# Patient Record
Sex: FEMALE | Race: OTHER | Employment: UNEMPLOYED | URBAN - METROPOLITAN AREA
[De-identification: names, ages, dates, MRNs, and addresses within clinical notes are randomized per-mention and may not be internally consistent; named-entity substitution may affect disease eponyms.]

---

## 2017-02-06 ENCOUNTER — TELEPHONE (OUTPATIENT)
Dept: RHEUMATOLOGY | Facility: CLINIC | Age: 47
End: 2017-02-06

## 2017-02-27 ENCOUNTER — LAB ENCOUNTER (OUTPATIENT)
Dept: LAB | Facility: HOSPITAL | Age: 47
End: 2017-02-27
Attending: INTERNAL MEDICINE
Payer: COMMERCIAL

## 2017-02-27 DIAGNOSIS — Z51.81 ENCOUNTER FOR THERAPEUTIC DRUG MONITORING: ICD-10-CM

## 2017-02-27 DIAGNOSIS — M05.79 RHEUMATOID ARTHRITIS INVOLVING MULTIPLE SITES WITH POSITIVE RHEUMATOID FACTOR (HCC): ICD-10-CM

## 2017-02-27 LAB
ALBUMIN SERPL BCP-MCNC: 2.9 G/DL (ref 3.5–4.8)
AST SERPL-CCNC: 18 U/L (ref 15–41)
BASOPHILS # BLD: 0.1 K/UL (ref 0–0.2)
BASOPHILS NFR BLD: 1 %
CREAT SERPL-MCNC: 0.52 MG/DL (ref 0.5–1.5)
CRP SERPL-MCNC: 10.8 MG/DL (ref 0–0.9)
EOSINOPHIL # BLD: 0.2 K/UL (ref 0–0.7)
EOSINOPHIL NFR BLD: 4 %
ERYTHROCYTE [DISTWIDTH] IN BLOOD BY AUTOMATED COUNT: 20.2 % (ref 11–15)
ERYTHROCYTE [SEDIMENTATION RATE] IN BLOOD: 41 MM/HR (ref 0–20)
HCT VFR BLD AUTO: 25.7 % (ref 35–48)
HGB BLD-MCNC: 8.2 G/DL (ref 12–16)
LYMPHOCYTES # BLD: 1 K/UL (ref 1–4)
LYMPHOCYTES NFR BLD: 24 %
MCH RBC QN AUTO: 23 PG (ref 27–32)
MCHC RBC AUTO-ENTMCNC: 32 G/DL (ref 32–37)
MCV RBC AUTO: 71.8 FL (ref 80–100)
MONOCYTES # BLD: 0.3 K/UL (ref 0–1)
MONOCYTES NFR BLD: 7 %
NEUTROPHILS # BLD AUTO: 2.9 K/UL (ref 1.8–7.7)
NEUTROPHILS NFR BLD: 64 %
PLATELET # BLD AUTO: 538 K/UL (ref 140–400)
PMV BLD AUTO: 7.7 FL (ref 7.4–10.3)
RBC # BLD AUTO: 3.57 M/UL (ref 3.7–5.4)
WBC # BLD AUTO: 4.5 K/UL (ref 4–11)

## 2017-02-27 PROCEDURE — 86140 C-REACTIVE PROTEIN: CPT

## 2017-02-27 PROCEDURE — 84450 TRANSFERASE (AST) (SGOT): CPT

## 2017-02-27 PROCEDURE — 82565 ASSAY OF CREATININE: CPT

## 2017-02-27 PROCEDURE — 85652 RBC SED RATE AUTOMATED: CPT

## 2017-02-27 PROCEDURE — 85025 COMPLETE CBC W/AUTO DIFF WBC: CPT

## 2017-02-27 PROCEDURE — 82040 ASSAY OF SERUM ALBUMIN: CPT

## 2017-02-27 PROCEDURE — 36415 COLL VENOUS BLD VENIPUNCTURE: CPT

## 2017-02-28 ENCOUNTER — TELEPHONE (OUTPATIENT)
Dept: RHEUMATOLOGY | Facility: CLINIC | Age: 47
End: 2017-02-28

## 2017-02-28 ENCOUNTER — TELEPHONE (OUTPATIENT)
Dept: INTERNAL MEDICINE CLINIC | Facility: CLINIC | Age: 47
End: 2017-02-28

## 2017-02-28 DIAGNOSIS — M05.79 SEROPOSITIVE RHEUMATOID ARTHRITIS OF MULTIPLE SITES (HCC): Primary | ICD-10-CM

## 2017-02-28 NOTE — TELEPHONE ENCOUNTER
Pt advised of below and aware to schedule appt with hematologist. Results and referral mailed to pt. Referral approved and valid for 1 year. Pt given f/u appt with Dr. Rosalina Dai for 3/3. Referral request sent to IM.

## 2017-02-28 NOTE — TELEPHONE ENCOUNTER
Please give her a call. She has severe anemia, which continues. When I last saw her October 28th of 2016, I initiated a referral to hematology for Dr. Megan Blount. Please check to see if this was approved.   If not, we will need to refer her again to levon

## 2017-03-07 NOTE — TELEPHONE ENCOUNTER
LOV: 10-28-16  Last Refilled:#104, 1rf  10/21/16  Labs:AST 18 2/27/17    Future Appointments  Date Time Provider Jasper Villa   3/8/2017 10:00 AM Kirby Spears MD [address] [place] [address] HEM ONC EMO       Please advise.

## 2017-03-08 ENCOUNTER — HOSPITAL ENCOUNTER (OUTPATIENT)
Dept: GENERAL RADIOLOGY | Facility: HOSPITAL | Age: 47
Discharge: HOME | End: 2017-03-08
Attending: INTERNAL MEDICINE
Payer: COMMERCIAL

## 2017-03-08 ENCOUNTER — OFFICE VISIT (OUTPATIENT)
Dept: HEMATOLOGY/ONCOLOGY | Facility: HOSPITAL | Age: 47
End: 2017-03-08
Attending: INTERNAL MEDICINE
Payer: COMMERCIAL

## 2017-03-08 ENCOUNTER — LAB ENCOUNTER (OUTPATIENT)
Dept: LAB | Facility: HOSPITAL | Age: 47
End: 2017-03-08
Attending: INTERNAL MEDICINE
Payer: COMMERCIAL

## 2017-03-08 VITALS
TEMPERATURE: 99 F | RESPIRATION RATE: 16 BRPM | BODY MASS INDEX: 21.68 KG/M2 | SYSTOLIC BLOOD PRESSURE: 130 MMHG | HEART RATE: 115 BPM | HEIGHT: 64 IN | WEIGHT: 127 LBS | DIASTOLIC BLOOD PRESSURE: 86 MMHG

## 2017-03-08 DIAGNOSIS — D64.9 ANEMIA, UNSPECIFIED TYPE: ICD-10-CM

## 2017-03-08 DIAGNOSIS — D47.2 MGUS (MONOCLONAL GAMMOPATHY OF UNKNOWN SIGNIFICANCE): Primary | ICD-10-CM

## 2017-03-08 DIAGNOSIS — D47.2 MGUS (MONOCLONAL GAMMOPATHY OF UNKNOWN SIGNIFICANCE): ICD-10-CM

## 2017-03-08 LAB
ALBUMIN SERPL BCP-MCNC: 3 G/DL (ref 3.5–4.8)
ALBUMIN/GLOB SERPL: 0.6 {RATIO} (ref 1–2)
ALP SERPL-CCNC: 49 U/L (ref 32–100)
ALT SERPL-CCNC: 9 U/L (ref 14–54)
ANION GAP SERPL CALC-SCNC: 7 MMOL/L (ref 0–18)
AST SERPL-CCNC: 18 U/L (ref 15–41)
BASOPHILS # BLD: 0.1 K/UL (ref 0–0.2)
BASOPHILS NFR BLD: 1 %
BILIRUB SERPL-MCNC: 0.4 MG/DL (ref 0.3–1.2)
BUN SERPL-MCNC: 10 MG/DL (ref 8–20)
BUN/CREAT SERPL: 16.9 (ref 10–20)
CALCIUM SERPL-MCNC: 9.1 MG/DL (ref 8.5–10.5)
CHLORIDE SERPL-SCNC: 103 MMOL/L (ref 95–110)
CO2 SERPL-SCNC: 26 MMOL/L (ref 22–32)
CREAT SERPL-MCNC: 0.59 MG/DL (ref 0.5–1.5)
DIRECT COOMBS POLY: NEGATIVE
EOSINOPHIL # BLD: 0.2 K/UL (ref 0–0.7)
EOSINOPHIL NFR BLD: 3 %
ERYTHROCYTE [DISTWIDTH] IN BLOOD BY AUTOMATED COUNT: 20.2 % (ref 11–15)
FERRITIN SERPL IA-MCNC: 20 NG/ML (ref 11–307)
FOLATE SERPL-MCNC: 17.1 NG/ML
GLOBULIN PLAS-MCNC: 5.2 G/DL (ref 2.5–3.7)
GLUCOSE SERPL-MCNC: 94 MG/DL (ref 70–99)
HAPTOGLOB SERPL-MCNC: 236 MG/DL (ref 16–200)
HCT VFR BLD AUTO: 26.4 % (ref 35–48)
HGB BLD-MCNC: 8.3 G/DL (ref 12–16)
IGA SERPL-MCNC: 804 MG/DL (ref 68–378)
IGM SERPL-MCNC: 438 MG/DL (ref 60–263)
IMMUNOGLOBULIN PNL SER-MCNC: 2139 MG/DL (ref 694–1618)
IRON SATN MFR SERPL: 5 % (ref 15–50)
IRON SERPL-MCNC: 17 MCG/DL (ref 28–170)
LDH SERPL L TO P-CCNC: 140 U/L (ref 98–192)
LYMPHOCYTES # BLD: 1.1 K/UL (ref 1–4)
LYMPHOCYTES NFR BLD: 21 %
MCH RBC QN AUTO: 22.4 PG (ref 27–32)
MCHC RBC AUTO-ENTMCNC: 31.5 G/DL (ref 32–37)
MCV RBC AUTO: 71.2 FL (ref 80–100)
MONOCYTES # BLD: 0.4 K/UL (ref 0–1)
MONOCYTES NFR BLD: 7 %
NEUTROPHILS # BLD AUTO: 3.5 K/UL (ref 1.8–7.7)
NEUTROPHILS NFR BLD: 68 %
OSMOLALITY UR CALC.SUM OF ELEC: 281 MOSM/KG (ref 275–295)
PLATELET # BLD AUTO: 599 K/UL (ref 140–400)
PMV BLD AUTO: 7.8 FL (ref 7.4–10.3)
POTASSIUM SERPL-SCNC: 3.8 MMOL/L (ref 3.3–5.1)
PROT SERPL-MCNC: 8.2 G/DL (ref 5.9–8.4)
RBC # BLD AUTO: 3.71 M/UL (ref 3.7–5.4)
SODIUM SERPL-SCNC: 136 MMOL/L (ref 136–144)
TIBC SERPL-MCNC: 326 MCG/DL (ref 228–428)
TRANSFERRIN SERPL-MCNC: 247 MG/DL (ref 192–382)
TRANSFERRIN SERPL-MCNC: 247 MG/DL (ref 192–382)
VIT B12 SERPL-MCNC: 691 PG/ML (ref 181–914)
WBC # BLD AUTO: 5.1 K/UL (ref 4–11)

## 2017-03-08 PROCEDURE — 85025 COMPLETE CBC W/AUTO DIFF WBC: CPT

## 2017-03-08 PROCEDURE — 83020 HEMOGLOBIN ELECTROPHORESIS: CPT

## 2017-03-08 PROCEDURE — 82728 ASSAY OF FERRITIN: CPT

## 2017-03-08 PROCEDURE — 84165 PROTEIN E-PHORESIS SERUM: CPT

## 2017-03-08 PROCEDURE — 36415 COLL VENOUS BLD VENIPUNCTURE: CPT

## 2017-03-08 PROCEDURE — 83615 LACTATE (LD) (LDH) ENZYME: CPT

## 2017-03-08 PROCEDURE — 80053 COMPREHEN METABOLIC PANEL: CPT

## 2017-03-08 PROCEDURE — 86334 IMMUNOFIX E-PHORESIS SERUM: CPT

## 2017-03-08 PROCEDURE — 99245 OFF/OP CONSLTJ NEW/EST HI 55: CPT | Performed by: INTERNAL MEDICINE

## 2017-03-08 PROCEDURE — 84466 ASSAY OF TRANSFERRIN: CPT

## 2017-03-08 PROCEDURE — 82746 ASSAY OF FOLIC ACID SERUM: CPT

## 2017-03-08 PROCEDURE — 77075 RADEX OSSEOUS SURVEY COMPL: CPT

## 2017-03-08 PROCEDURE — 85060 BLOOD SMEAR INTERPRETATION: CPT

## 2017-03-08 PROCEDURE — 82607 VITAMIN B-12: CPT

## 2017-03-08 PROCEDURE — 82784 ASSAY IGA/IGD/IGG/IGM EACH: CPT

## 2017-03-08 PROCEDURE — 83883 ASSAY NEPHELOMETRY NOT SPEC: CPT

## 2017-03-08 PROCEDURE — 86880 COOMBS TEST DIRECT: CPT

## 2017-03-08 PROCEDURE — 83021 HEMOGLOBIN CHROMOTOGRAPHY: CPT

## 2017-03-08 PROCEDURE — 99212 OFFICE O/P EST SF 10 MIN: CPT | Performed by: INTERNAL MEDICINE

## 2017-03-08 PROCEDURE — 83540 ASSAY OF IRON: CPT

## 2017-03-08 PROCEDURE — 83010 ASSAY OF HAPTOGLOBIN QUANT: CPT

## 2017-03-08 RX ORDER — MAG HYDROX/ALUMINUM HYD/SIMETH 400-400-40
SUSPENSION, ORAL (FINAL DOSE FORM) ORAL
COMMUNITY

## 2017-03-08 RX ORDER — FERROUS SULFATE 325(65) MG
325 TABLET ORAL 2 TIMES DAILY WITH MEALS
Qty: 60 TABLET | Refills: 2 | Status: SHIPPED | OUTPATIENT
Start: 2017-03-08

## 2017-03-08 NOTE — PROGRESS NOTES
Hematology Consultation Note    Patient Name: Gretchen Covert   YOB: 1970   Medical Record Number: [id number]   CSN: [id number]   Consulting Physician: Sheba Spencer MD  Referring Physician(s): Myke Mclaughlin  Date of Consultation: 3/8/ HISTORY  2008    Comment fibroid surgery       Family Medical History:  Family History   Problem Relation Age of Onset   • Prostate Cancer Father    • Diabetes Father    • Hypertension Mother    • Musculo-skelatal Disorder Mother      spinal stenosis   • A tablet (5 mg total) by mouth once daily. Disp: 90 tablet Rfl: 3   TraMADol HCl 50 MG Oral Tab Take 1 tablet (50 mg total) by mouth every 6 (six) hours as needed for Pain. Disp: 60 tablet Rfl: 1   Misc.  Devices (QUICK-FIT CRUTCHES) Does not apply Misc 1 kit and rhythm. S1S2 normal.  Abdomen: Soft, non tender with good bowel sounds. No hepatosplenomegaly. No palpable mass. Extremities: No edema or calf tenderness. +Jayesh's nodes in right PIP second & third digits  Neurological: Grossly intact.       Labs: TRANSFERRIN, LDH, HAPTOGLOBIN, JOSEPH, DIRECT,         VITAMIN Q92, FOLIC ACID SERUM(FOLATE),         HEMOGLOBINOPATHY PROFILE  56 yo F with PMH of anemia since childhood and noted IgG MGUS via immunofixation in 4/2014 presents for continued evalua iron replacement with ferrous sulfate 325 mg BID with meals.  Informed pt of side effects of possible nausea, dark stools, and constipation    RTC in 1 week for return of results and to discuss next steps in management    Emotional Well Being:    Emotional

## 2017-03-08 NOTE — PATIENT INSTRUCTIONS
Please, get labs drawn today and schedule your skeletal x-ray test as soon as possible    Please, return for follow up in 1 week

## 2017-03-10 LAB
ALBUMIN SERPL ELPH-MCNC: 3.34 G/DL (ref 3.8–5.8)
ALBUMIN/GLOB SERPL: 0.8 {RATIO} (ref 1–2)
ALPHA1 GLOB SERPL ELPH-MCNC: 0.28 G/DL (ref 0.1–0.3)
ALPHA2 GLOB SERPL ELPH-MCNC: 0.75 G/DL (ref 0.6–1)
B-GLOBULIN SERPL ELPH-MCNC: 1.14 G/DL (ref 0.7–1.3)
GAMMA GLOB SERPL ELPH-MCNC: 2 G/DL (ref 0.5–1.7)
HGB A2 MFR BLD: 1.7 % (ref 1.5–3.5)
HGB F MFR BLD: 0 % (ref 0–2)
HGB PNL BLD ELPH: 97.1 % (ref 95.5–100)
HGB S MFR BLD: 0 %
KAPPA FREE LIGHT CHAIN: 7.44 MG/DL (ref 0.33–1.94)
KAPPA/LAMBDA FLC RATIO: 1.04 (ref 0.26–1.65)
LAMBDA FREE LIGHT CHAIN: 7.16 MG/DL (ref 0.57–2.63)
M PROTEIN SERPL ELPH-MCNC: 0.89 G/DL
TOTAL PROTEIN (SPECIAL TESTING): 7.5 G/DL (ref 6.5–9.1)

## 2017-03-15 ENCOUNTER — TELEPHONE (OUTPATIENT)
Dept: HEMATOLOGY/ONCOLOGY | Facility: HOSPITAL | Age: 47
End: 2017-03-15

## 2017-03-15 ENCOUNTER — APPOINTMENT (OUTPATIENT)
Dept: HEMATOLOGY/ONCOLOGY | Facility: HOSPITAL | Age: 47
End: 2017-03-15
Attending: INTERNAL MEDICINE
Payer: COMMERCIAL

## 2017-03-15 NOTE — TELEPHONE ENCOUNTER
Call request reviewed with Dr Mau Arechiga. Per MD, patient called and reassured her skeletal survey and labwork were reviewed by Dr Mau Arechiga and there was nothing serious to report.  He would, however,  like to provide her with the benefit of full explainantion of

## 2017-03-15 NOTE — TELEPHONE ENCOUNTER
Esther Dailey canceled her appointment today. She says she has bad Rheumatoid arthritis and with the weather she will not be able to make it. But she is very anxious for the results, can someone call her with a update? She will still like to reschedule as well.  Pl

## 2017-03-21 ENCOUNTER — OFFICE VISIT (OUTPATIENT)
Dept: HEMATOLOGY/ONCOLOGY | Facility: HOSPITAL | Age: 47
End: 2017-03-21
Attending: INTERNAL MEDICINE
Payer: COMMERCIAL

## 2017-03-21 VITALS
HEIGHT: 64 IN | HEART RATE: 105 BPM | RESPIRATION RATE: 16 BRPM | SYSTOLIC BLOOD PRESSURE: 126 MMHG | TEMPERATURE: 99 F | DIASTOLIC BLOOD PRESSURE: 86 MMHG

## 2017-03-21 DIAGNOSIS — D50.0 IRON DEFICIENCY ANEMIA DUE TO CHRONIC BLOOD LOSS: Primary | ICD-10-CM

## 2017-03-21 PROCEDURE — 99215 OFFICE O/P EST HI 40 MIN: CPT | Performed by: INTERNAL MEDICINE

## 2017-03-21 PROCEDURE — 99212 OFFICE O/P EST SF 10 MIN: CPT | Performed by: INTERNAL MEDICINE

## 2017-03-21 NOTE — PROGRESS NOTES
Hematology Progress Note    Patient Name: Russ Hoyos   YOB: 1970   Medical Record Number: [id number]    Consulting Physician: Bishop Kaufman MD  Referring Physician(s): Milind Nelson  Date of Visit: 3/21/2017     Chief Complaint:   IgG of systems she denies any new concerns or complaints today. Farooq Nguyenbear mentions problems related to her rheumatoid arthritis with significant joint pain over the last week or so.        Allergies:     Erythromycin                Comment:Other reaction(s): nause pruritus. Hematologic/lymphatic: Negative for easy bruising, bleeding, and lymphadenopathy.   Musculoskeletal: Positive for stiffness, arthralgias, joint pain with rheumatoid nodules in her PIP region  Neurological: Negative for headaches, dizziness, speec 03/08/2017 11:51 AM   ALKPHO 49 03/08/2017 11:51 AM   AST 18 03/08/2017 11:51 AM   ALT 9* 03/08/2017 11:51 AM         Impression:    (D50.0) Iron deficiency anemia due to chronic blood loss  (primary encounter diagnosis)  Plan: CBC WITH DIFFERENTIAL WITH P deficiency    --Informed patient that based on her -American descent and family history of a paternal cousin with sickle cell anemia; she might have a hemoglobinopathy such as sickle cell trait or beta thalasemmia  --Hemoglobin electrophoresis is no

## 2017-05-23 ENCOUNTER — APPOINTMENT (OUTPATIENT)
Dept: HEMATOLOGY/ONCOLOGY | Facility: HOSPITAL | Age: 47
End: 2017-05-23
Attending: INTERNAL MEDICINE
Payer: COMMERCIAL

## 2017-06-12 RX ORDER — NAPROXEN 500 MG/1
TABLET ORAL
Qty: 240 TABLET | Refills: 0 | Status: SHIPPED | OUTPATIENT
Start: 2017-06-12 | End: 2017-06-30

## 2017-06-19 NOTE — TELEPHONE ENCOUNTER
Phoned patient and left a message on her personal voice mail to do labs and schedule a f/u. Please see Dr. Jamee Velasquez note below.

## 2017-06-30 ENCOUNTER — TELEPHONE (OUTPATIENT)
Dept: RHEUMATOLOGY | Facility: CLINIC | Age: 47
End: 2017-06-30

## 2017-06-30 NOTE — TELEPHONE ENCOUNTER
Spoke with pharmacy and will keep naproxen 250 mg 1-2 tablets BID prn with food to avoid confusion for the pt.

## 2017-06-30 NOTE — TELEPHONE ENCOUNTER
Palomo Sheikh from [facility] wants clarification on the medication listed below. States that the patient has been on 250 MG but has a new script for 500 MG. Wants to clarify which is more current - so that she can close out one or the other. Please advise.    Humberto Rachel

## 2017-07-14 ENCOUNTER — TELEPHONE (OUTPATIENT)
Dept: RHEUMATOLOGY | Facility: CLINIC | Age: 47
End: 2017-07-14

## 2017-07-14 DIAGNOSIS — M05.79 SEROPOSITIVE RHEUMATOID ARTHRITIS OF MULTIPLE SITES (HCC): Primary | ICD-10-CM

## 2017-07-14 DIAGNOSIS — Z51.81 ENCOUNTER FOR THERAPEUTIC DRUG MONITORING: ICD-10-CM

## 2017-08-12 ENCOUNTER — APPOINTMENT (OUTPATIENT)
Dept: LAB | Facility: HOSPITAL | Age: 47
End: 2017-08-12
Attending: INTERNAL MEDICINE
Payer: COMMERCIAL

## 2017-08-12 ENCOUNTER — OFFICE VISIT (OUTPATIENT)
Dept: RHEUMATOLOGY | Facility: CLINIC | Age: 47
End: 2017-08-12

## 2017-08-12 VITALS
HEIGHT: 64 IN | SYSTOLIC BLOOD PRESSURE: 124 MMHG | TEMPERATURE: 98 F | DIASTOLIC BLOOD PRESSURE: 81 MMHG | HEART RATE: 85 BPM

## 2017-08-12 DIAGNOSIS — G89.29 CHRONIC PAIN OF BOTH KNEES: ICD-10-CM

## 2017-08-12 DIAGNOSIS — Z51.81 ENCOUNTER FOR THERAPEUTIC DRUG MONITORING: ICD-10-CM

## 2017-08-12 DIAGNOSIS — D47.2 MGUS (MONOCLONAL GAMMOPATHY OF UNKNOWN SIGNIFICANCE): ICD-10-CM

## 2017-08-12 DIAGNOSIS — M85.89 OSTEOPENIA OF MULTIPLE SITES: ICD-10-CM

## 2017-08-12 DIAGNOSIS — M05.79 RHEUMATOID ARTHRITIS INVOLVING MULTIPLE SITES WITH POSITIVE RHEUMATOID FACTOR (HCC): Primary | ICD-10-CM

## 2017-08-12 DIAGNOSIS — M25.561 CHRONIC PAIN OF BOTH KNEES: ICD-10-CM

## 2017-08-12 DIAGNOSIS — M25.562 CHRONIC PAIN OF BOTH KNEES: ICD-10-CM

## 2017-08-12 DIAGNOSIS — D50.9 IRON DEFICIENCY ANEMIA, UNSPECIFIED IRON DEFICIENCY ANEMIA TYPE: ICD-10-CM

## 2017-08-12 PROCEDURE — 20610 DRAIN/INJ JOINT/BURSA W/O US: CPT | Performed by: INTERNAL MEDICINE

## 2017-08-12 PROCEDURE — 99212 OFFICE O/P EST SF 10 MIN: CPT | Performed by: INTERNAL MEDICINE

## 2017-08-12 PROCEDURE — 99214 OFFICE O/P EST MOD 30 MIN: CPT | Performed by: INTERNAL MEDICINE

## 2017-08-12 RX ORDER — PREDNISONE 1 MG/1
5 TABLET ORAL
Qty: 90 TABLET | Refills: 3 | Status: SHIPPED | OUTPATIENT
Start: 2017-08-12 | End: 2017-08-18

## 2017-08-12 RX ORDER — NAPROXEN 500 MG/1
TABLET ORAL
Qty: 180 TABLET | Refills: 1 | Status: SHIPPED | OUTPATIENT
Start: 2017-08-12 | End: 2018-09-27

## 2017-08-12 RX ORDER — METHYLPREDNISOLONE ACETATE 40 MG/ML
40 INJECTION, SUSPENSION INTRA-ARTICULAR; INTRALESIONAL; INTRAMUSCULAR; SOFT TISSUE
Status: SHIPPED | OUTPATIENT
Start: 2017-08-12 | End: 2017-08-12

## 2017-08-12 RX ORDER — PANTOPRAZOLE SODIUM 40 MG/1
40 TABLET, DELAYED RELEASE ORAL
Qty: 90 TABLET | Refills: 1 | Status: SHIPPED | OUTPATIENT
Start: 2017-08-12

## 2017-08-12 RX ORDER — FOLIC ACID 1 MG/1
1 TABLET ORAL
Qty: 90 TABLET | Refills: 3 | Status: SHIPPED | OUTPATIENT
Start: 2017-08-12

## 2017-08-12 RX ORDER — TRAMADOL HYDROCHLORIDE 50 MG/1
50 TABLET ORAL EVERY 6 HOURS PRN
Qty: 100 TABLET | Refills: 1 | Status: SHIPPED | OUTPATIENT
Start: 2017-08-12 | End: 2018-09-27

## 2017-08-12 NOTE — PROCEDURES
Joint Injection  Pre-procedure care:  Consent was obtained, Procedure/risks were explained, Questions were answered, Patient was prepped and draped in the usual sterile fashion, Correct side and site confirmed and Correct patient identified  Rheumatoid art

## 2017-08-12 NOTE — PROGRESS NOTES
HPI:    Patient ID: Danielle Do is a 52year old female. Vinny Su is a 57-year-old woman with severely destructive rheumatoid arthritis, who I last saw October 28th of 2016.  She has since continued prednisone 5 mg a day, the increased dose of methotr diaphoresis and fever. Respiratory: Negative for shortness of breath. Cardiovascular: Negative for chest pain. Gastrointestinal: Negative for abdominal pain. Skin: Negative for rash. Hematological: Negative for adenopathy.             Current Out She exhibits no mass. There is no tenderness. There is no rebound and no guarding. Musculoskeletal: She exhibits no edema. Shoulder motion is limited and painful. Deformity of both elbows. She cannot fully extend them.   Wrist motion is limited and pa Continue tramadol 50 mg every 6 hours as needed for pain. 7.  Bone health. Chronic prednisone. A DEXA scan was reordered. Continue calcium and vitamin D. No orders of the defined types were placed in this encounter.       Meds This Visit:  No presc

## 2017-08-15 ENCOUNTER — TELEPHONE (OUTPATIENT)
Dept: RHEUMATOLOGY | Facility: CLINIC | Age: 47
End: 2017-08-15

## 2017-08-15 NOTE — TELEPHONE ENCOUNTER
Advised pt, [facility] is having difficulty processing Humira script. Pt stated it was okay to sent script to [address] [place] [address].

## 2017-08-18 RX ORDER — PREDNISONE 1 MG/1
TABLET ORAL
Qty: 90 TABLET | Refills: 0 | Status: SHIPPED | OUTPATIENT
Start: 2017-08-18

## 2017-08-18 NOTE — TELEPHONE ENCOUNTER
Spoke with pt and let her know to have labs done. Pt asked about Dexa Scan order. I gave her the number to Central Scheduling and informed her the order is in the system.

## 2017-09-05 ENCOUNTER — TELEPHONE (OUTPATIENT)
Dept: RHEUMATOLOGY | Facility: CLINIC | Age: 47
End: 2017-09-05

## 2017-09-05 NOTE — TELEPHONE ENCOUNTER
Correct. She needs to schedule her DEXA scan. She also is long overdue for her standing labs. She can do them when she comes in for her DEXA scan. She did not go to the lab to have them done after I last saw her. Please let her know. Thank you.

## 2017-09-05 NOTE — TELEPHONE ENCOUNTER
Please see below and advise. XR BONE SURVEY, COMPLETE (CPT=77075) was completed in March.  Complete Dexa Scan??

## 2017-09-25 ENCOUNTER — OFFICE VISIT (OUTPATIENT)
Dept: INTERNAL MEDICINE CLINIC | Facility: CLINIC | Age: 47
End: 2017-09-25

## 2017-09-25 ENCOUNTER — LAB ENCOUNTER (OUTPATIENT)
Dept: LAB | Facility: HOSPITAL | Age: 47
End: 2017-09-25
Attending: INTERNAL MEDICINE
Payer: COMMERCIAL

## 2017-09-25 VITALS
SYSTOLIC BLOOD PRESSURE: 118 MMHG | TEMPERATURE: 98 F | HEART RATE: 102 BPM | HEIGHT: 64 IN | DIASTOLIC BLOOD PRESSURE: 79 MMHG

## 2017-09-25 DIAGNOSIS — Z00.00 ANNUAL PHYSICAL EXAM: ICD-10-CM

## 2017-09-25 DIAGNOSIS — Z51.81 ENCOUNTER FOR THERAPEUTIC DRUG MONITORING: ICD-10-CM

## 2017-09-25 DIAGNOSIS — Z00.00 ANNUAL PHYSICAL EXAM: Primary | ICD-10-CM

## 2017-09-25 DIAGNOSIS — M05.79 SEROPOSITIVE RHEUMATOID ARTHRITIS OF MULTIPLE SITES (HCC): ICD-10-CM

## 2017-09-25 LAB
ALBUMIN SERPL BCP-MCNC: 3 G/DL (ref 3.5–4.8)
ALBUMIN/GLOB SERPL: 0.6 {RATIO} (ref 1–2)
ALP SERPL-CCNC: 55 U/L (ref 32–100)
ALT SERPL-CCNC: 8 U/L (ref 14–54)
ANION GAP SERPL CALC-SCNC: 8 MMOL/L (ref 0–18)
AST SERPL-CCNC: 16 U/L (ref 15–41)
BASOPHILS # BLD: 0 K/UL (ref 0–0.2)
BASOPHILS NFR BLD: 1 %
BILIRUB SERPL-MCNC: 0.3 MG/DL (ref 0.3–1.2)
BUN SERPL-MCNC: 11 MG/DL (ref 8–20)
BUN/CREAT SERPL: 20.4 (ref 10–20)
CALCIUM SERPL-MCNC: 8.9 MG/DL (ref 8.5–10.5)
CHLORIDE SERPL-SCNC: 102 MMOL/L (ref 95–110)
CO2 SERPL-SCNC: 26 MMOL/L (ref 22–32)
CREAT SERPL-MCNC: 0.54 MG/DL (ref 0.5–1.5)
CRP SERPL-MCNC: 17 MG/DL (ref 0–0.9)
EOSINOPHIL # BLD: 0.1 K/UL (ref 0–0.7)
EOSINOPHIL NFR BLD: 2 %
ERYTHROCYTE [DISTWIDTH] IN BLOOD BY AUTOMATED COUNT: 19.6 % (ref 11–15)
ERYTHROCYTE [SEDIMENTATION RATE] IN BLOOD: 74 MM/HR (ref 0–20)
GLOBULIN PLAS-MCNC: 5 G/DL (ref 2.5–3.7)
GLUCOSE SERPL-MCNC: 95 MG/DL (ref 70–99)
HCT VFR BLD AUTO: 26.1 % (ref 35–48)
HGB BLD-MCNC: 8.3 G/DL (ref 12–16)
LYMPHOCYTES # BLD: 1.1 K/UL (ref 1–4)
LYMPHOCYTES NFR BLD: 26 %
MCH RBC QN AUTO: 22.8 PG (ref 27–32)
MCHC RBC AUTO-ENTMCNC: 31.8 G/DL (ref 32–37)
MCV RBC AUTO: 71.7 FL (ref 80–100)
MONOCYTES # BLD: 0.3 K/UL (ref 0–1)
MONOCYTES NFR BLD: 8 %
NEUTROPHILS # BLD AUTO: 2.6 K/UL (ref 1.8–7.7)
NEUTROPHILS NFR BLD: 63 %
OSMOLALITY UR CALC.SUM OF ELEC: 281 MOSM/KG (ref 275–295)
PLATELET # BLD AUTO: 636 K/UL (ref 140–400)
PMV BLD AUTO: 7.9 FL (ref 7.4–10.3)
POTASSIUM SERPL-SCNC: 3.6 MMOL/L (ref 3.3–5.1)
PROT SERPL-MCNC: 8 G/DL (ref 5.9–8.4)
RBC # BLD AUTO: 3.64 M/UL (ref 3.7–5.4)
SODIUM SERPL-SCNC: 136 MMOL/L (ref 136–144)
TSH SERPL-ACNC: 0.85 UIU/ML (ref 0.45–5.33)
WBC # BLD AUTO: 4.1 K/UL (ref 4–11)

## 2017-09-25 PROCEDURE — 85652 RBC SED RATE AUTOMATED: CPT

## 2017-09-25 PROCEDURE — 84443 ASSAY THYROID STIM HORMONE: CPT

## 2017-09-25 PROCEDURE — 36415 COLL VENOUS BLD VENIPUNCTURE: CPT

## 2017-09-25 PROCEDURE — 86140 C-REACTIVE PROTEIN: CPT

## 2017-09-25 PROCEDURE — 99396 PREV VISIT EST AGE 40-64: CPT | Performed by: INTERNAL MEDICINE

## 2017-09-25 PROCEDURE — 85025 COMPLETE CBC W/AUTO DIFF WBC: CPT

## 2017-09-25 PROCEDURE — 80053 COMPREHEN METABOLIC PANEL: CPT

## 2017-09-25 NOTE — PROGRESS NOTES
HPI:    Patient ID: Raimundo Umana is a 52year old female. HPI  Annual Preventative Exam  Patient  arrives today for annual preventative physical examination. The patient complains of no new problems and has 0/10 pain.        Review of Systems   Cons Rfl: 1   TraMADol HCl 50 MG Oral Tab Take 1 tablet (50 mg total) by mouth every 6 (six) hours as needed for Pain.  Disp: 100 tablet Rfl: 1   methotrexate 2.5 MG Oral Tab TAKE 8 TABLETS BY MOUTH EVERY FRIDAY WITH DINNER Disp: 104 tablet Rfl: 1   folic acid 1 (1.626 m)         There is no height or weight on file to calculate BMI. ASSESSMENT/PLAN:   (Z00.00) Annual physical exam  (primary encounter diagnosis)  Pt presented to the clinic for a physical examination, which was unremarkable.    Plan:  -CBC

## 2017-09-27 ENCOUNTER — TELEPHONE (OUTPATIENT)
Dept: ADMINISTRATIVE | Age: 47
End: 2017-09-27

## 2017-09-27 NOTE — TELEPHONE ENCOUNTER
Dr. David Ruiz pending in [facility]. Last year, pt's spouse had 1-7 days per month for the year of intermittent time off to care for pt. Do you approve the same time off? Please advise.      Thank you,  Meredith Ashton

## 2017-10-06 ENCOUNTER — TELEPHONE (OUTPATIENT)
Dept: RHEUMATOLOGY | Facility: CLINIC | Age: 47
End: 2017-10-06

## 2017-10-10 ENCOUNTER — TELEPHONE (OUTPATIENT)
Dept: RHEUMATOLOGY | Facility: CLINIC | Age: 47
End: 2017-10-10

## 2017-10-10 NOTE — TELEPHONE ENCOUNTER
Our office received a fax from [facility] stating they were unable to reach the pt for Humira delivery. Left message to call  Pharmacy to schedule delivery date for Humira at [phone].

## 2017-12-28 RX ORDER — METHOTREXATE 2.5 MG/1
TABLET ORAL
Qty: 104 TABLET | Refills: 0 | Status: SHIPPED | OUTPATIENT
Start: 2017-12-28 | End: 2022-04-06

## 2017-12-28 NOTE — TELEPHONE ENCOUNTER
LOV: 8-12-17  Last Refilled:#104, 1rf 8/12/17  Labs:AST 16  9/25/17    No future appointments. Please advise.

## 2018-01-11 ENCOUNTER — TELEPHONE (OUTPATIENT)
Dept: RHEUMATOLOGY | Facility: CLINIC | Age: 48
End: 2018-01-11

## 2018-01-11 NOTE — TELEPHONE ENCOUNTER
Pharmacy called in stating that she was running the Humira through her protocol and it came back as it needs a high dollar overview. Pharmacy stating that they need Dr. Jessica Morgan to review this and let them know.  Pharmacy states to call #[phone] to sp

## 2018-01-11 NOTE — TELEPHONE ENCOUNTER
Spoke with pharmacy and advised them PA for Humira is valid until 2/18. Per pharmacy another PA is needed for high dollar override. Pharmacy stated they would send form to office.  After 20 minutes on hold, office was advised no additional PA is need at [facility]

## 2018-04-05 RX ORDER — TRAMADOL HYDROCHLORIDE 50 MG/1
50 TABLET ORAL EVERY 6 HOURS PRN
Qty: 100 TABLET | Refills: 1 | OUTPATIENT
Start: 2018-04-05

## 2018-04-05 RX ORDER — NAPROXEN 500 MG/1
TABLET ORAL
Qty: 180 TABLET | Refills: 1 | OUTPATIENT
Start: 2018-04-05

## 2018-04-05 NOTE — TELEPHONE ENCOUNTER
LOV: 8/12/17 refilled at [facility]  Future Appointments  Date Time Provider Jasper Villa   4/16/2018 5:30 PM Waqar Hoff MD [facility]     Labs:   Component      Latest Ref Rng & Units 9/25/2017   Glucose      70 - 99 mg/dL 95   Sodium      136 - 144 mmol/L 136   Potassium      3.3 - 5.1 mmol/L 3.6   Chloride      95 - 110 mmol/L 102   Carbon Dioxide, Total      22 - 32 mmol/L 26   BUN      8 - 20 mg/dL 11   CREATININE      0.50 - 1.50 mg/dL 0.54   CALCIUM      8.5 - 10.5 mg/dL 8.9   ALT (SGPT)      14 - 54 U/L 8 (L)   AST (SGOT)      15 - 41 U/L 16   ALKALINE PHOSPHATASE      32 - 100 U/L 55   Total Bilirubin      0.3 - 1.2 mg/dL 0.3   TOTAL PROTEIN      5.9 - 8.4 g/dL 8.0   Albumin      3.5 - 4.8 g/dL 3.0 (L)   GLOBULIN, TOTAL      2.5 - 3.7 g/dL 5.0 (H)   A/G RATIO      1.0 - 2.0 0.6 (L)   ANION GAP      0 - 18 mmol/L 8   BUN/CREA RATIO      10.0 - 20.0 20.4 (H)   CALCULATED OSMOLALITY      275 - 295 mOsm/kg 281   GFR, Non-      >=60 >60   GFR, -American      >=60 >60

## 2018-04-05 NOTE — TELEPHONE ENCOUNTER
Saima Raphael is calling b/c she states they faxed over refill request on March 26th and 30th and are awaiting approval. pls advise. Thank you      Current Outpatient Prescriptions:  naproxen 500 MG Oral Tab Take one twice daily with food. Disp: 180 tablet Rfl: 1   TraMADol HCl 50 MG Oral Tab Take 1 tablet (50 mg total) by mouth every 6 (six) hours as needed for Pain.  Disp: 100 tablet Rfl: 1

## 2018-09-27 ENCOUNTER — TELEPHONE (OUTPATIENT)
Dept: INTERNAL MEDICINE CLINIC | Facility: CLINIC | Age: 48
End: 2018-09-27

## 2018-09-27 ENCOUNTER — OFFICE VISIT (OUTPATIENT)
Dept: INTERNAL MEDICINE CLINIC | Facility: CLINIC | Age: 48
End: 2018-09-27

## 2018-09-27 VITALS
HEIGHT: 64 IN | WEIGHT: 130.19 LBS | TEMPERATURE: 98 F | SYSTOLIC BLOOD PRESSURE: 136 MMHG | DIASTOLIC BLOOD PRESSURE: 95 MMHG | HEART RATE: 103 BPM | BODY MASS INDEX: 22.23 KG/M2

## 2018-09-27 DIAGNOSIS — Z74.09 IMPAIRED MOBILITY AND ADLS: ICD-10-CM

## 2018-09-27 DIAGNOSIS — Z51.81 ENCOUNTER FOR THERAPEUTIC DRUG MONITORING: ICD-10-CM

## 2018-09-27 DIAGNOSIS — Z12.39 SCREENING FOR BREAST CANCER: ICD-10-CM

## 2018-09-27 DIAGNOSIS — M05.79 RHEUMATOID ARTHRITIS INVOLVING MULTIPLE SITES WITH POSITIVE RHEUMATOID FACTOR (HCC): Primary | ICD-10-CM

## 2018-09-27 DIAGNOSIS — D47.2 MGUS (MONOCLONAL GAMMOPATHY OF UNKNOWN SIGNIFICANCE): ICD-10-CM

## 2018-09-27 DIAGNOSIS — Z78.9 IMPAIRED MOBILITY AND ADLS: ICD-10-CM

## 2018-09-27 DIAGNOSIS — D50.9 IRON DEFICIENCY ANEMIA, UNSPECIFIED IRON DEFICIENCY ANEMIA TYPE: ICD-10-CM

## 2018-09-27 DIAGNOSIS — Z74.09 IMPAIRED MOBILITY AND ENDURANCE: ICD-10-CM

## 2018-09-27 PROCEDURE — 99212 OFFICE O/P EST SF 10 MIN: CPT | Performed by: INTERNAL MEDICINE

## 2018-09-27 PROCEDURE — 99214 OFFICE O/P EST MOD 30 MIN: CPT | Performed by: INTERNAL MEDICINE

## 2018-09-27 RX ORDER — TRAMADOL HYDROCHLORIDE 50 MG/1
50 TABLET ORAL EVERY 6 HOURS PRN
Qty: 100 TABLET | Refills: 1 | Status: SHIPPED | OUTPATIENT
Start: 2018-09-27

## 2018-09-27 RX ORDER — NAPROXEN 500 MG/1
TABLET ORAL
Qty: 180 TABLET | Refills: 1 | Status: SHIPPED | OUTPATIENT
Start: 2018-09-27 | End: 2020-10-30

## 2018-09-27 NOTE — ASSESSMENT & PLAN NOTE
On methotrexate 20 mg weekly, Humira and prednisone 5 mg. RA diagnosed in her late 25s. On disability due to RA. Ambulates with moderate scooter. Joint deformities of bilateral knee, bilateral finger joints and hips.   Takes naproxen twice daily and tra

## 2018-09-27 NOTE — PROGRESS NOTES
Ines Noriega is a 50year old female.   Patient presents with:  Arthritis: pt is requesting a referral for       HPI:     HPI   Patient is a 80-year-old female with a history of rheumatoid arthritis, MGUS and chronic anemia here to loulou age.  Has been on disability since age 32. Current Outpatient Medications:  TraMADol HCl 50 MG Oral Tab Take 1 tablet (50 mg total) by mouth every 6 (six) hours as needed for Pain.  Disp: 100 tablet Rfl: 1   naproxen 500 MG Oral Tab Take one twice nikunj HENT: Negative for congestion, sinus pain, sore throat and tinnitus. Eyes: Negative for blurred vision and double vision. Respiratory: Negative for cough, sputum production, shortness of breath and wheezing.     Cardiovascular: Negative for chest pete scooter. Joint deformities of bilateral knee, bilateral finger joints and hips. Takes naproxen twice daily and tramadol as needed. Refill both. Side effects of chronic use of NSAIDs discussed. Advised to substitute with Tylenol if pain is mild.   She v Tab; Take 1 tablet (50 mg total) by mouth every 6 (six) hours as needed for Pain.  -     naproxen 500 MG Oral Tab; Take one twice daily with food. The patient indicates understanding of these issues and agrees to the plan.               Celio Tarango

## 2018-09-27 NOTE — PATIENT INSTRUCTIONS
Suggested Tylenol 1 g-2 extra strength Tylenol-500 mg every 8 hours as needed for pain as first line. Not to exceed more than 4 g per day.   Discussed side effects of chronic use of NSAIDs like risk of CAD, hypertension, kidney dysfunction, gastritis/PUD a

## 2018-09-27 NOTE — ASSESSMENT & PLAN NOTE
Impaired mobility due to severe RA and deformities. Ambulates with a motorized scooter. Patient is functionality deconditioned. Would benefit from PT. Referral for home health for home PT/OT placed due to homebound status.

## 2018-09-27 NOTE — ASSESSMENT & PLAN NOTE
She was evidenced to have IgG MGUS in 2014. She follows up with Dr. Janes Zhong. For MGUS she is getting 6-month surveillance for serum free light chains and SPEP and serial monitoring of CBC and CMP to assess for malignant transformation of MGUS to MM.

## 2018-09-27 NOTE — ASSESSMENT & PLAN NOTE
Chronic. Last hemoglobin tIn September 2017 was 8.3. Taking multivitamin and extra iron. Follow-up with hematology. Labs pending.

## 2018-09-28 NOTE — TELEPHONE ENCOUNTER
FMLA form for Dr. Virgilio Romo received in [facility]+ FCR+ Signed release, pt paid $25 with . Logged for processing.  NK

## 2018-10-09 ENCOUNTER — TELEPHONE (OUTPATIENT)
Dept: INTERNAL MEDICINE CLINIC | Facility: CLINIC | Age: 48
End: 2018-10-09

## 2018-10-09 NOTE — TELEPHONE ENCOUNTER
Dr. Nabor Barreto,     Please sign off on form:  -Highlight the patient and hit \"Chart\" button. -In Chart Review, w/in the Encounter tab - click 1 time on the Telephone call encounter for 9/27/18. Scroll down the telephone encounter.  -Click \"scan on\" blue Hyperlink under \"Media\" heading for FMLA Dr. Nabor Barreto 9/27/18 w/in the telephone enc.  -Click on Acknowledge button at the bottom right corner and left-click onto image, signature stamp appears and drag signature to Provider signature line. Stamp will turn blue. Close window.     Thank you,    Amanda Plasencia.

## 2018-10-09 NOTE — TELEPHONE ENCOUNTER
Pt called in stating that she contacted the In-home PT office, Deborah Cabezas, and she was instructed to have her PCP call them at #[phone] and ask for the referral department. Please advise.

## 2018-10-10 NOTE — TELEPHONE ENCOUNTER
Completed FMLA for spouse Maximo [facility] and original mailed to patient.  SC

## 2018-10-10 NOTE — TELEPHONE ENCOUNTER
Spoke with patient and told her that everything was sent to [facility]. Confirmation received.

## 2018-10-12 NOTE — TELEPHONE ENCOUNTER
Dr Tanisha Angeles, please note. Patient called, still no HH in-home PT yet.  She was told that her insurance does not have the referral to [facility], that the referral needs to go to the [employer] site, their computer portal, to be put through the

## 2018-10-17 ENCOUNTER — TELEPHONE (OUTPATIENT)
Dept: INTERNAL MEDICINE CLINIC | Facility: CLINIC | Age: 48
End: 2018-10-17

## 2018-10-17 NOTE — TELEPHONE ENCOUNTER
Pt is calling to check status of message. Please call pt at number prefer to let her know orders have been faxed.

## 2018-10-18 NOTE — TELEPHONE ENCOUNTER
Pt is calling to check on status of message. Pt states orders and referral have not been received. Please call pt.

## 2018-10-18 NOTE — TELEPHONE ENCOUNTER
Spoke with patient and she states that she spoke to [facility] and they received the referral. No further action needed.

## 2018-10-22 ENCOUNTER — TELEPHONE (OUTPATIENT)
Dept: INTERNAL MEDICINE CLINIC | Facility: CLINIC | Age: 48
End: 2018-10-22

## 2018-10-22 NOTE — TELEPHONE ENCOUNTER
Lynn from [facility] would like to inform Dr that pt has started [facility]  PT    Please advise

## 2018-10-25 ENCOUNTER — TELEPHONE (OUTPATIENT)
Dept: OTHER | Age: 48
End: 2018-10-25

## 2018-10-25 NOTE — TELEPHONE ENCOUNTER
No leave it as it is. The naproxen is as needed for pain. Limit the use of naproxen . MTX per rheumatology.  She should follow up with Rheum

## 2018-10-25 NOTE — TELEPHONE ENCOUNTER
Received call from Physical therapist  [facility]- stated she was checking Pt's med list - there is a drug interaction with rx METHOTREXATE 2.5 MG Oral Tab and naproxen 500 MG Oral Tab  Asking if you want to make changes or leave as is

## 2018-11-06 ENCOUNTER — TELEPHONE (OUTPATIENT)
Dept: INTERNAL MEDICINE CLINIC | Facility: CLINIC | Age: 48
End: 2018-11-06

## 2018-11-06 NOTE — TELEPHONE ENCOUNTER
Destinee Rivas states that he would like to extend orders for physical therapy for the patient. He is requesting for 2 times a week for one week.

## 2018-11-06 NOTE — TELEPHONE ENCOUNTER
Derek RIVAS from [facility] called back to get a verbal order, gave message below   [address] verbalized understanding

## 2018-11-07 ENCOUNTER — TELEPHONE (OUTPATIENT)
Dept: OTHER | Age: 48
End: 2018-11-07

## 2018-11-07 NOTE — TELEPHONE ENCOUNTER
Benjamin Lam Occupational Therapist from [facility] calling to request extension for OT. Benjamin Lam requests OT x 1 time this week, then 2 x weekly. Please advise.

## 2018-11-08 ENCOUNTER — TELEPHONE (OUTPATIENT)
Dept: INTERNAL MEDICINE CLINIC | Facility: CLINIC | Age: 48
End: 2018-11-08

## 2018-11-30 ENCOUNTER — TELEPHONE (OUTPATIENT)
Dept: INTERNAL MEDICINE CLINIC | Facility: CLINIC | Age: 48
End: 2018-11-30

## 2018-11-30 NOTE — TELEPHONE ENCOUNTER
Hakan Moy w [facility] PT requesting verbal order to extend PT for twice per week for 3 weeks starting week of 12/03/2018. Please call Hakan Moy with verbal order.

## 2018-12-03 ENCOUNTER — TELEPHONE (OUTPATIENT)
Dept: INTERNAL MEDICINE CLINIC | Facility: CLINIC | Age: 48
End: 2018-12-03

## 2018-12-03 DIAGNOSIS — M05.79 RHEUMATOID ARTHRITIS INVOLVING MULTIPLE SITES WITH POSITIVE RHEUMATOID FACTOR (HCC): Primary | ICD-10-CM

## 2018-12-03 NOTE — TELEPHONE ENCOUNTER
Spoke with Gianluca Smith at [facility] and he states that he needs approval for Physical Therapy not Occupational. Please advise.

## 2018-12-03 NOTE — TELEPHONE ENCOUNTER
Lorelei Shepherd from [facility] is calling to request occupational therapy services from  for 2x this week beginning Dec 3rd and once next week for pt. This is for fall prevention and strengthening of pt. Please advise thank you.

## 2018-12-04 NOTE — TELEPHONE ENCOUNTER
Spoke with Finesse Nails at [facility] and message was given. Nathan verbalized understanding.

## 2018-12-13 ENCOUNTER — TELEPHONE (OUTPATIENT)
Dept: INTERNAL MEDICINE CLINIC | Facility: CLINIC | Age: 48
End: 2018-12-13

## 2018-12-21 ENCOUNTER — TELEPHONE (OUTPATIENT)
Dept: OTHER | Age: 48
End: 2018-12-21

## 2018-12-21 NOTE — TELEPHONE ENCOUNTER
Yas the physical therapist from [facility] stated that he found an interaction between Naproxen and Methotrexate. His system told him that there is an interaction. He did not know what the interaction is but he needs to inform you.

## 2018-12-22 NOTE — TELEPHONE ENCOUNTER
The interaction is naproxen increasing the levels of methotrexate. Patient follows up with rheumatology. Naproxen is only as needed.   Try to avoid naproxen the first 2 days when she takes methotrexate-

## 2018-12-24 NOTE — TELEPHONE ENCOUNTER
Yas the physical therapist made aware with understanding. He asked me to inform the patient which was done. The patient stated understood.

## 2019-01-07 ENCOUNTER — TELEPHONE (OUTPATIENT)
Dept: INTERNAL MEDICINE CLINIC | Facility: CLINIC | Age: 49
End: 2019-01-07

## 2019-01-07 NOTE — TELEPHONE ENCOUNTER
Yusuf Dela Cruz from [facility] called requesting verbal to reschedule OT from this week to next week.  Please Armin Collet

## 2019-02-13 ENCOUNTER — TELEPHONE (OUTPATIENT)
Dept: INTERNAL MEDICINE CLINIC | Facility: CLINIC | Age: 49
End: 2019-02-13

## 2019-02-13 NOTE — TELEPHONE ENCOUNTER
[facility] is requesting pt to be discharge from [address] [place] [address] and physical therapy upon pt request       Please advise

## 2019-06-21 ENCOUNTER — PATIENT OUTREACH (OUTPATIENT)
Dept: CASE MANAGEMENT | Age: 49
End: 2019-06-21

## 2019-09-18 ENCOUNTER — TELEPHONE (OUTPATIENT)
Dept: INTERNAL MEDICINE CLINIC | Facility: CLINIC | Age: 49
End: 2019-09-18

## 2019-09-18 NOTE — TELEPHONE ENCOUNTER
Pt will have spouse drop off FMLA. Pt has an appt on 9/53/13 to recert her FMLA. Aware of fee and HIPAA.

## 2019-10-01 ENCOUNTER — APPOINTMENT (OUTPATIENT)
Dept: LAB | Facility: HOSPITAL | Age: 49
End: 2019-10-01
Attending: INTERNAL MEDICINE
Payer: COMMERCIAL

## 2019-10-01 ENCOUNTER — OFFICE VISIT (OUTPATIENT)
Dept: INTERNAL MEDICINE CLINIC | Facility: CLINIC | Age: 49
End: 2019-10-01

## 2019-10-01 VITALS
BODY MASS INDEX: 21.34 KG/M2 | TEMPERATURE: 98 F | HEIGHT: 64 IN | OXYGEN SATURATION: 99 % | WEIGHT: 125 LBS | DIASTOLIC BLOOD PRESSURE: 87 MMHG | HEART RATE: 96 BPM | SYSTOLIC BLOOD PRESSURE: 150 MMHG

## 2019-10-01 DIAGNOSIS — Z12.4 SCREENING FOR CERVICAL CANCER: ICD-10-CM

## 2019-10-01 DIAGNOSIS — D47.2 MGUS (MONOCLONAL GAMMOPATHY OF UNKNOWN SIGNIFICANCE): ICD-10-CM

## 2019-10-01 DIAGNOSIS — Z12.31 BREAST CANCER SCREENING BY MAMMOGRAM: ICD-10-CM

## 2019-10-01 DIAGNOSIS — M05.79 RHEUMATOID ARTHRITIS INVOLVING MULTIPLE SITES WITH POSITIVE RHEUMATOID FACTOR (HCC): Primary | ICD-10-CM

## 2019-10-01 DIAGNOSIS — M05.79 RHEUMATOID ARTHRITIS INVOLVING MULTIPLE SITES WITH POSITIVE RHEUMATOID FACTOR (HCC): ICD-10-CM

## 2019-10-01 DIAGNOSIS — D50.8 OTHER IRON DEFICIENCY ANEMIA: ICD-10-CM

## 2019-10-01 DIAGNOSIS — Z74.09 IMPAIRED MOBILITY AND ENDURANCE: ICD-10-CM

## 2019-10-01 PROCEDURE — 83540 ASSAY OF IRON: CPT

## 2019-10-01 PROCEDURE — 36415 COLL VENOUS BLD VENIPUNCTURE: CPT

## 2019-10-01 PROCEDURE — 80053 COMPREHEN METABOLIC PANEL: CPT

## 2019-10-01 PROCEDURE — 85027 COMPLETE CBC AUTOMATED: CPT

## 2019-10-01 PROCEDURE — 80061 LIPID PANEL: CPT

## 2019-10-01 PROCEDURE — 99214 OFFICE O/P EST MOD 30 MIN: CPT | Performed by: INTERNAL MEDICINE

## 2019-10-01 PROCEDURE — 84443 ASSAY THYROID STIM HORMONE: CPT

## 2019-10-01 PROCEDURE — 84466 ASSAY OF TRANSFERRIN: CPT

## 2019-10-01 NOTE — PROGRESS NOTES
Kya Mack is a 52year old female. Patient presents with:  Establish Care: needs info for [facility] paperwork  Referral: for [place]    HPI:   66-year-old female with a past medical history of rheumatoid arthritis with inability to do activities daily estuardo significance) 2014   • Osteoporosis screening 2014    per NG   • Rheumatoid arthritis ([facility] [address]       Past Surgical History:   Procedure Laterality Date   •      • OTHER SURGICAL HISTORY      fibroid surgery      Social History:  Willie Nice lb (56.7 kg)   LMP 09/25/2019 (Exact Date)   SpO2 99%   Breastfeeding? No   BMI 21.46 kg/m²     Physical Exam    Constitutional: She is oriented to person, place, and time. She appears well-nourished. HENT:   Head: Normocephalic. Neck: Neck supple.    C gynecology.   I also given her an order for mammogram.  I encouraged him to do all these things in 1 days so that they can avoid further trip to the hospital as it is hard for the  to bring the patient to the hospital.      The patient indicates unde

## 2019-10-09 NOTE — TELEPHONE ENCOUNTER
Dr. Norah Hendricks,    [facility] Recert for spouse to care for pt: Same amount of time as last year: 1-7 days per month    Please sign off on form:  -Highlight the patient and hit \"Chart\" button.   -In Chart Review, w/in the Encounter tab - click 1 time on the [facility]

## 2019-10-14 ENCOUNTER — TELEPHONE (OUTPATIENT)
Dept: INTERNAL MEDICINE CLINIC | Facility: CLINIC | Age: 49
End: 2019-10-14

## 2019-10-14 DIAGNOSIS — M05.79 RHEUMATOID ARTHRITIS INVOLVING MULTIPLE SITES WITH POSITIVE RHEUMATOID FACTOR (HCC): Primary | ICD-10-CM

## 2019-10-14 NOTE — TELEPHONE ENCOUNTER
Pt called stating she has orders for in home PT and is requesting more the two visits. Please advise.

## 2019-10-15 NOTE — TELEPHONE ENCOUNTER
Spoke to pt , advised spoke to nurse at [address] [place] [address],[address]- fax order to [phone]  Will f/u tomorrow with nurse

## 2019-10-17 ENCOUNTER — TELEPHONE (OUTPATIENT)
Dept: INTERNAL MEDICINE CLINIC | Facility: CLINIC | Age: 49
End: 2019-10-17

## 2019-10-17 NOTE — TELEPHONE ENCOUNTER
Lupillo Birmingham RN [facility] states admitted patient was admitted today. States there is a drug interaction between Naproxen and Methotrexate; patient's bp today was 158/88, then 140/88. RN visits approved 2 times, so will be discharging next week.

## 2019-10-22 NOTE — TELEPHONE ENCOUNTER
Ce Calles from [facility]   called to advise that tomorrow will be her last day with patient (nursing only) as physical therapy will continue and then they will do the discharge

## 2019-10-31 ENCOUNTER — TELEPHONE (OUTPATIENT)
Dept: INTERNAL MEDICINE CLINIC | Facility: CLINIC | Age: 49
End: 2019-10-31

## 2019-10-31 NOTE — TELEPHONE ENCOUNTER
Malina from [place] [address] is requesting verbal order to extend physical therapy for one week x1 and two week x2. Please advise.      Thank you

## 2019-11-01 ENCOUNTER — MED REC SCAN ONLY (OUTPATIENT)
Dept: INTERNAL MEDICINE CLINIC | Facility: CLINIC | Age: 49
End: 2019-11-01

## 2019-11-07 ENCOUNTER — TELEPHONE (OUTPATIENT)
Dept: OTHER | Age: 49
End: 2019-11-07

## 2019-11-07 NOTE — TELEPHONE ENCOUNTER
[facility] for patient.    Per Malina patient will have PT this week and next Tuesday as order was for only 2 visits and wanted to inform Dr. Clarke Rae that patient will be discharged next Tuiesday, 11-12-19

## 2019-11-08 NOTE — TELEPHONE ENCOUNTER
ROB Radford from [facility] states patient refused PT today, so will be doing it twice next week with discharge date of 11/13 - Wednesday.

## 2019-11-11 ENCOUNTER — TELEPHONE (OUTPATIENT)
Dept: INTERNAL MEDICINE CLINIC | Facility: CLINIC | Age: 49
End: 2019-11-11

## 2019-11-11 DIAGNOSIS — M06.9 RHEUMATOID ARTHRITIS, INVOLVING UNSPECIFIED SITE, UNSPECIFIED RHEUMATOID FACTOR PRESENCE: Primary | ICD-10-CM

## 2019-11-11 NOTE — TELEPHONE ENCOUNTER
Per patient she needs more referral for her in home physical Therapy by [facility] and Physical Therapist told her that she has only 11 visits that, it should be 17 visits it should be at least 45-an hour every visit.  So patient need 10 more P

## 2019-11-11 NOTE — TELEPHONE ENCOUNTER
Nahun from [facility] is requesting one visit for occupational therapy week of 11/11/19.

## 2019-11-12 NOTE — TELEPHONE ENCOUNTER
Patient called requesting the below information,  She also added 7 physical therapy and 2 visits with nurse. Total 16 visits    Please clarify the original request and patient wants a copy for her self.   Patient is scheduled for therapy this Friday so need

## 2019-11-14 NOTE — TELEPHONE ENCOUNTER
Dr. Maria Elena Negron can you write a new referral with the additional visits so I can send it to pt please.

## 2019-11-20 NOTE — TELEPHONE ENCOUNTER
Malina from [facility] is requesting an order for in home PT once a week for week of 11/17/19 until they get further authorization from patient's insurance. Please advise.

## 2019-11-21 NOTE — TELEPHONE ENCOUNTER
Detailed message left on VM for Malina. No further action needed.   Referral is still pending approval.

## 2019-11-22 ENCOUNTER — NURSE TRIAGE (OUTPATIENT)
Dept: OTHER | Age: 49
End: 2019-11-22

## 2019-11-22 NOTE — TELEPHONE ENCOUNTER
Patient's physical therapist, Janie Lee, called stating patient did not have therapy today because the patient stated she was not feeling well. Patient's last vitals were BP: 144/90 and HR: 117.  She also stated patient is not on any blood pressure medica

## 2019-11-22 NOTE — TELEPHONE ENCOUNTER
In regards to this patient, please review the number of encounters we have in the month of November. I am not sure what is happening with home health physical therapy.   I totally understand that patient is not able to move around because of her rheumatoid

## 2019-11-22 NOTE — TELEPHONE ENCOUNTER
Relayed Dr message to [address] nurse, both verbalized understanding    Nurse Clarissa Duckworth stated although 16 visits were ordered ,Pt's INS only allowed 7, today was Pt's 7 th visit ,since Pt was not done today requesting either Wednesday or Thursday

## 2019-11-22 NOTE — TELEPHONE ENCOUNTER
Action Requested: Summary for Provider     []  Critical Lab, Recommendations Needed  [x] Need Additional Advice  []   FYI    []   Need Orders  [x] Need Medications Sent to Pharmacy  []  Other     SUMMARY: Patient requesting medication to help with body a

## 2019-11-23 NOTE — TELEPHONE ENCOUNTER
Called patient's physical therapist, Scott Reeves. Relayed to see that it is okay to do physical therapy this upcoming Wednesday or Thursday. Malina verbalized understanding.

## 2019-11-26 ENCOUNTER — TELEPHONE (OUTPATIENT)
Dept: OTHER | Age: 49
End: 2019-11-26

## 2019-11-26 NOTE — TELEPHONE ENCOUNTER
Noted.  I am not sure what I can do to help her. I have seen her only once. She has rheumatoid arthritis and needs help. As I mentioned before she needs to follow-up with rheumatology and get back on medicine so that she will be able to function better.

## 2019-11-26 NOTE — TELEPHONE ENCOUNTER
Called received from,[employer] for [employer]    Asking what is the plan for Pt going forward, the last referral sent for PT was canceled d/t MCG guidelines, approved only 7 PT visits, 2 nurse visits, 2 OT visits, Pt have used all of them    Pt c

## 2019-11-27 NOTE — TELEPHONE ENCOUNTER
Patient called stating she needed the start/end date to reflect the date the form was signed. Revised and faxed to [facility] [phone].  SC None

## 2019-12-16 ENCOUNTER — TELEPHONE (OUTPATIENT)
Dept: INTERNAL MEDICINE CLINIC | Facility: CLINIC | Age: 49
End: 2019-12-16

## 2019-12-16 NOTE — TELEPHONE ENCOUNTER
ROB Radford with [facility] is signing off from PT. Last visit was 11-27-19. No further insurance authorization.

## 2020-04-14 ENCOUNTER — NURSE TRIAGE (OUTPATIENT)
Dept: OTHER | Age: 50
End: 2020-04-14

## 2020-04-14 RX ORDER — ACETAMINOPHEN 500 MG
500 TABLET ORAL EVERY 8 HOURS PRN
Qty: 60 TABLET | Refills: 0 | Status: SHIPPED | OUTPATIENT
Start: 2020-04-14

## 2020-04-14 NOTE — TELEPHONE ENCOUNTER
Action Requested: Summary for Provider     []  Critical Lab, Recommendations Needed  [] Need Additional Advice  []   FYI    []   Need Orders  [] Need Medications Sent to Pharmacy  []  Other     SUMMARY: pt is asking for a prescription for extra strength ty

## 2020-07-07 ENCOUNTER — TELEPHONE (OUTPATIENT)
Dept: INTERNAL MEDICINE CLINIC | Facility: CLINIC | Age: 50
End: 2020-07-07

## 2020-09-22 ENCOUNTER — TELEPHONE (OUTPATIENT)
Dept: INTERNAL MEDICINE CLINIC | Facility: CLINIC | Age: 50
End: 2020-09-22

## 2020-09-22 ENCOUNTER — OFFICE VISIT (OUTPATIENT)
Dept: INTERNAL MEDICINE CLINIC | Facility: CLINIC | Age: 50
End: 2020-09-22

## 2020-09-22 VITALS
OXYGEN SATURATION: 99 % | SYSTOLIC BLOOD PRESSURE: 136 MMHG | DIASTOLIC BLOOD PRESSURE: 80 MMHG | WEIGHT: 120 LBS | BODY MASS INDEX: 20.49 KG/M2 | TEMPERATURE: 98 F | HEIGHT: 64 IN | HEART RATE: 106 BPM

## 2020-09-22 DIAGNOSIS — Z12.11 SCREEN FOR COLON CANCER: ICD-10-CM

## 2020-09-22 DIAGNOSIS — D47.2 MGUS (MONOCLONAL GAMMOPATHY OF UNKNOWN SIGNIFICANCE): ICD-10-CM

## 2020-09-22 DIAGNOSIS — Z12.4 SCREENING FOR CERVICAL CANCER: ICD-10-CM

## 2020-09-22 DIAGNOSIS — Z12.31 BREAST CANCER SCREENING BY MAMMOGRAM: Primary | ICD-10-CM

## 2020-09-22 DIAGNOSIS — M06.9 RHEUMATOID ARTHRITIS, INVOLVING UNSPECIFIED SITE, UNSPECIFIED RHEUMATOID FACTOR PRESENCE: ICD-10-CM

## 2020-09-22 DIAGNOSIS — Z00.00 ADULT GENERAL MEDICAL EXAM: ICD-10-CM

## 2020-09-22 PROCEDURE — 3075F SYST BP GE 130 - 139MM HG: CPT | Performed by: INTERNAL MEDICINE

## 2020-09-22 PROCEDURE — 99396 PREV VISIT EST AGE 40-64: CPT | Performed by: INTERNAL MEDICINE

## 2020-09-22 PROCEDURE — 3079F DIAST BP 80-89 MM HG: CPT | Performed by: INTERNAL MEDICINE

## 2020-09-22 PROCEDURE — 3008F BODY MASS INDEX DOCD: CPT | Performed by: INTERNAL MEDICINE

## 2020-09-22 NOTE — PROGRESS NOTES
Addie Nichols is a 48year old female. Patient presents with:  Physical  Hip Pain: rt hip ---- bilateral knee pain    HPI:   80-year-old lady with a past medical history of rheumatoid arthritis, MGUS here for physical.  I saw her almost 1 year back.   She is by mouth every morning before breakfast. (Patient not taking: Reported on 9/22/2020 ) 90 tablet 1   • folic acid 1 MG Oral Tab Take 1 tablet (1 mg total) by mouth once daily.  (Patient not taking: Reported on 9/22/2020 ) 90 tablet 3   • Cholecalciferol (VIT Positive for fatigue. Negative for appetite change, fever and unexpected weight change. HENT: Negative for congestion, ear pain, nosebleeds and sore throat. Eyes: Negative for pain. Respiratory: Negative for cough, chest tightness and wheezing.     C abdominal tenderness. Neurological: She is alert and oriented to person, place, and time. No cranial nerve deficit, sensory deficit or motor deficit. Coordination normal.   Skin: Skin is warm and dry. Psychiatric: She has a normal mood and affect.  Her stool test as the refuse colonoscopy, referral for gynecology for Pap smear again today. The patient indicates understanding of these issues and agrees to the plan. Return in about 3 months (around 12/22/2020).

## 2020-10-02 NOTE — TELEPHONE ENCOUNTER
Message left for pt that Dr. Clarke Rae signed off on FMLA papers and for you to please complete stool card and mammogram as soon as possible.

## 2020-10-02 NOTE — TELEPHONE ENCOUNTER
Dr. Tanya Pink     Please sign off on form: Fmla recert  -Highlight the patient and hit \"Chart\" button.   -In Chart Review, w/in the Encounter tab - click 1 time on the Telephone call encounter for 9/22/2020 Scroll down the telephone encounter.  -Click Almas Gutierrez

## 2020-10-02 NOTE — TELEPHONE ENCOUNTER
I am going to sign off on her FMLA form. Please inform patient that she has to complete her stool test, mammogram as soon as possible.   Thank you

## 2020-10-29 ENCOUNTER — TELEPHONE (OUTPATIENT)
Dept: INTERNAL MEDICINE CLINIC | Facility: CLINIC | Age: 50
End: 2020-10-29

## 2020-10-29 NOTE — TELEPHONE ENCOUNTER
Patient called and advised she would like to get off of the Naproxen for a little while. She would like to know if she can be prescribed Ibuprin 800 MGs instead for a little while.     Patient states she forgot to ask the Dr this when she was in the office

## 2020-10-30 RX ORDER — IBUPROFEN 800 MG/1
800 TABLET ORAL 2 TIMES DAILY PRN
Qty: 60 TABLET | Refills: 1 | Status: SHIPPED | OUTPATIENT
Start: 2020-10-30 | End: 2020-11-29

## 2020-10-30 NOTE — TELEPHONE ENCOUNTER
I have sent ibuprofen to the pharmacy. Please let her take it only with food.     I have noticed that she has not done any of my recommendations from her previous office visit that includes mammography, blood testing, rheumatology follow-up, stool testing

## 2020-11-03 NOTE — TELEPHONE ENCOUNTER
Patient contacted advised her that one month rx for ibuprofen has been sent to the pharmacy but Dr Edwin Goldstein does want her to get blood tests done to check the kidney function.  Discussed the importance of getting blood tests done regulrly when taking medicat

## 2021-03-25 ENCOUNTER — TELEPHONE (OUTPATIENT)
Dept: INTERNAL MEDICINE CLINIC | Facility: CLINIC | Age: 51
End: 2021-03-25

## 2021-03-25 NOTE — TELEPHONE ENCOUNTER
Called patient advised by v/m to make sure she completes her Mammogram and outstanding labs from 9/2020

## 2021-07-13 ENCOUNTER — TELEPHONE (OUTPATIENT)
Dept: INTERNAL MEDICINE CLINIC | Facility: CLINIC | Age: 51
End: 2021-07-13

## 2021-07-13 DIAGNOSIS — Z12.31 BREAST CANCER SCREENING BY MAMMOGRAM: Primary | ICD-10-CM

## 2021-07-13 NOTE — TELEPHONE ENCOUNTER
Walter Frederick is requesting a new mammogram order. Diagnosis code is incorrect. Dx: Screening for cervical cancer [Z12.4 (ICD-10-CM). Please advise.

## 2021-07-23 ENCOUNTER — TELEPHONE (OUTPATIENT)
Dept: INTERNAL MEDICINE CLINIC | Facility: CLINIC | Age: 51
End: 2021-07-23

## 2021-07-23 DIAGNOSIS — E55.9 VITAMIN D DEFICIENCY: Primary | ICD-10-CM

## 2021-07-23 NOTE — TELEPHONE ENCOUNTER
Dr. Stephen Arreola, patient will be completing labs ordered on 09/22/2020, requesting Vitamin D to be added since she has had vitamin D deficiency in the past. Please advise.

## 2021-07-23 NOTE — TELEPHONE ENCOUNTER
Dr. Ed Berman, patient is also asking for Estrogen test due to not having her menstrual Cycle for over a year. Please advise.

## 2021-07-23 NOTE — TELEPHONE ENCOUNTER
Patient is requesting a Vitamin D lab order and any lab orders to determine if she is going through menopause. Patient states it has been about a year since she received her last menstrual cycle. Please advise.

## 2021-08-24 ENCOUNTER — LAB ENCOUNTER (OUTPATIENT)
Dept: LAB | Facility: HOSPITAL | Age: 51
End: 2021-08-24
Attending: INTERNAL MEDICINE
Payer: COMMERCIAL

## 2021-08-24 ENCOUNTER — HOSPITAL ENCOUNTER (OUTPATIENT)
Dept: MAMMOGRAPHY | Facility: HOSPITAL | Age: 51
Discharge: HOME | End: 2021-08-24
Attending: INTERNAL MEDICINE
Payer: COMMERCIAL

## 2021-08-24 DIAGNOSIS — Z12.31 BREAST CANCER SCREENING BY MAMMOGRAM: ICD-10-CM

## 2021-08-24 DIAGNOSIS — Z00.00 ADULT GENERAL MEDICAL EXAM: ICD-10-CM

## 2021-08-24 DIAGNOSIS — E55.9 VITAMIN D DEFICIENCY: ICD-10-CM

## 2021-08-24 LAB
ALBUMIN SERPL-MCNC: 3 G/DL (ref 3.4–5)
ALBUMIN/GLOB SERPL: 0.5 {RATIO} (ref 1–2)
ALP LIVER SERPL-CCNC: 95 U/L
ALT SERPL-CCNC: 16 U/L
ANION GAP SERPL CALC-SCNC: 6 MMOL/L (ref 0–18)
AST SERPL-CCNC: 16 U/L (ref 15–37)
BILIRUB SERPL-MCNC: 0.2 MG/DL (ref 0.1–2)
BUN BLD-MCNC: 14 MG/DL (ref 7–18)
BUN/CREAT SERPL: 26.4 (ref 10–20)
CALCIUM BLD-MCNC: 9.9 MG/DL (ref 8.5–10.1)
CHLORIDE SERPL-SCNC: 103 MMOL/L (ref 98–112)
CHOLEST SMN-MCNC: 144 MG/DL (ref ?–200)
CO2 SERPL-SCNC: 30 MMOL/L (ref 21–32)
CREAT BLD-MCNC: 0.53 MG/DL
DEPRECATED RDW RBC AUTO: 60.3 FL (ref 35.1–46.3)
ERYTHROCYTE [DISTWIDTH] IN BLOOD BY AUTOMATED COUNT: 22.1 % (ref 11–15)
EST. AVERAGE GLUCOSE BLD GHB EST-MCNC: 97 MG/DL (ref 68–126)
GLOBULIN PLAS-MCNC: 6.2 G/DL (ref 2.8–4.4)
GLUCOSE BLD-MCNC: 115 MG/DL (ref 70–99)
HBA1C MFR BLD HPLC: 5 % (ref ?–5.7)
HCT VFR BLD AUTO: 30.3 %
HDLC SERPL-MCNC: 44 MG/DL (ref 40–59)
HGB BLD-MCNC: 9.1 G/DL
LDLC SERPL CALC-MCNC: 87 MG/DL (ref ?–100)
M PROTEIN MFR SERPL ELPH: 9.2 G/DL (ref 6.4–8.2)
MCH RBC QN AUTO: 23.4 PG (ref 26–34)
MCHC RBC AUTO-ENTMCNC: 30 G/DL (ref 31–37)
MCV RBC AUTO: 77.9 FL
NONHDLC SERPL-MCNC: 100 MG/DL (ref ?–130)
OSMOLALITY SERPL CALC.SUM OF ELEC: 289 MOSM/KG (ref 275–295)
PATIENT FASTING Y/N/NP: NO
PATIENT FASTING Y/N/NP: NO
PLATELET # BLD AUTO: 728 10(3)UL (ref 150–450)
PLATELET MORPHOLOGY: NORMAL
POTASSIUM SERPL-SCNC: 4 MMOL/L (ref 3.5–5.1)
RBC # BLD AUTO: 3.89 X10(6)UL
SODIUM SERPL-SCNC: 139 MMOL/L (ref 136–145)
TRIGL SERPL-MCNC: 61 MG/DL (ref 30–149)
TSI SER-ACNC: 0.85 MIU/ML (ref 0.36–3.74)
VIT D+METAB SERPL-MCNC: 101.8 NG/ML (ref 30–100)
VLDLC SERPL CALC-MCNC: 10 MG/DL (ref 0–30)
WBC # BLD AUTO: 5.9 X10(3) UL (ref 4–11)

## 2021-08-24 PROCEDURE — 77063 BREAST TOMOSYNTHESIS BI: CPT | Performed by: INTERNAL MEDICINE

## 2021-08-24 PROCEDURE — 80061 LIPID PANEL: CPT

## 2021-08-24 PROCEDURE — 84443 ASSAY THYROID STIM HORMONE: CPT

## 2021-08-24 PROCEDURE — 85027 COMPLETE CBC AUTOMATED: CPT

## 2021-08-24 PROCEDURE — 36415 COLL VENOUS BLD VENIPUNCTURE: CPT

## 2021-08-24 PROCEDURE — 77067 SCR MAMMO BI INCL CAD: CPT | Performed by: INTERNAL MEDICINE

## 2021-08-24 PROCEDURE — 83036 HEMOGLOBIN GLYCOSYLATED A1C: CPT

## 2021-08-24 PROCEDURE — 80053 COMPREHEN METABOLIC PANEL: CPT

## 2021-08-24 PROCEDURE — 82306 VITAMIN D 25 HYDROXY: CPT

## 2021-09-23 ENCOUNTER — TELEPHONE (OUTPATIENT)
Dept: INTERNAL MEDICINE CLINIC | Facility: CLINIC | Age: 51
End: 2021-09-23

## 2021-09-23 NOTE — TELEPHONE ENCOUNTER
Pt will be submitting [employer] recert for spouse job. States no changes.  Emailed pt hipaa

## 2021-09-24 ENCOUNTER — APPOINTMENT (OUTPATIENT)
Dept: HEMATOLOGY/ONCOLOGY | Facility: HOSPITAL | Age: 51
End: 2021-09-24
Attending: INTERNAL MEDICINE
Payer: COMMERCIAL

## 2021-09-30 ENCOUNTER — APPOINTMENT (OUTPATIENT)
Dept: HEMATOLOGY/ONCOLOGY | Facility: HOSPITAL | Age: 51
End: 2021-09-30
Attending: INTERNAL MEDICINE
Payer: COMMERCIAL

## 2021-09-30 ENCOUNTER — OFFICE VISIT (OUTPATIENT)
Dept: INTERNAL MEDICINE CLINIC | Facility: CLINIC | Age: 51
End: 2021-09-30

## 2021-09-30 ENCOUNTER — LAB ENCOUNTER (OUTPATIENT)
Dept: LAB | Facility: HOSPITAL | Age: 51
End: 2021-09-30
Attending: INTERNAL MEDICINE
Payer: COMMERCIAL

## 2021-09-30 ENCOUNTER — TELEPHONE (OUTPATIENT)
Dept: INTERNAL MEDICINE CLINIC | Facility: CLINIC | Age: 51
End: 2021-09-30

## 2021-09-30 VITALS
OXYGEN SATURATION: 100 % | HEART RATE: 112 BPM | BODY MASS INDEX: 21.34 KG/M2 | DIASTOLIC BLOOD PRESSURE: 74 MMHG | HEIGHT: 64 IN | WEIGHT: 125 LBS | SYSTOLIC BLOOD PRESSURE: 112 MMHG | RESPIRATION RATE: 14 BRPM

## 2021-09-30 DIAGNOSIS — Z00.00 ADULT GENERAL MEDICAL EXAM: Primary | ICD-10-CM

## 2021-09-30 DIAGNOSIS — Z12.11 SCREENING FOR COLON CANCER: Primary | ICD-10-CM

## 2021-09-30 DIAGNOSIS — E88.09 HYPERPROTEINEMIA: ICD-10-CM

## 2021-09-30 DIAGNOSIS — M05.79 RHEUMATOID ARTHRITIS INVOLVING MULTIPLE SITES WITH POSITIVE RHEUMATOID FACTOR (HCC): ICD-10-CM

## 2021-09-30 DIAGNOSIS — Z12.11 SCREENING FOR COLON CANCER: ICD-10-CM

## 2021-09-30 PROCEDURE — 99396 PREV VISIT EST AGE 40-64: CPT | Performed by: INTERNAL MEDICINE

## 2021-09-30 PROCEDURE — 36415 COLL VENOUS BLD VENIPUNCTURE: CPT

## 2021-09-30 PROCEDURE — 85027 COMPLETE CBC AUTOMATED: CPT

## 2021-09-30 PROCEDURE — 3008F BODY MASS INDEX DOCD: CPT | Performed by: INTERNAL MEDICINE

## 2021-09-30 PROCEDURE — 3078F DIAST BP <80 MM HG: CPT | Performed by: INTERNAL MEDICINE

## 2021-09-30 PROCEDURE — 80053 COMPREHEN METABOLIC PANEL: CPT

## 2021-09-30 PROCEDURE — 3074F SYST BP LT 130 MM HG: CPT | Performed by: INTERNAL MEDICINE

## 2021-09-30 PROCEDURE — 82274 ASSAY TEST FOR BLOOD FECAL: CPT

## 2021-09-30 PROCEDURE — 83036 HEMOGLOBIN GLYCOSYLATED A1C: CPT

## 2021-09-30 RX ORDER — NAPROXEN 500 MG/1
500 TABLET ORAL 2 TIMES DAILY WITH MEALS
COMMUNITY
End: 2021-11-08

## 2021-10-01 NOTE — TELEPHONE ENCOUNTER
Patient LM and would like a copy of her completed form to be mailed to [facility] address and uploaded in [facility].

## 2021-10-03 NOTE — PROGRESS NOTES
Ac Arias is a 46year old female. Patient presents with:  Physical    HPI:   79-year-old lady with a past medical history of rheumatoid arthritis, MGUS, iron deficiency anemia here for physical.  She reports that she has not been taking any medications. taking: No sig reported) 60 tablet 2   • Ketoconazole 2 % External Shampoo Apply to scalp twice a week as directed. (Patient not taking: No sig reported) 360 Bottle 6   • Misc.  Devices (QUICK-FIT CRUTCHES) Does not apply Misc 1 kit by Does not apply route myalgias. Skin: Negative for wound. Psychiatric/Behavioral: Negative for behavioral problems.       Wt Readings from Last 5 Encounters:  09/30/21 : 125 lb (56.7 kg)  09/22/20 : 120 lb (54.4 kg)  10/01/19 : 125 lb (56.7 kg)  09/27/18 : 130 lb 3.2 oz (59. Affect: Mood normal.         Behavior: Behavior normal.         Thought Content: Thought content normal.         Judgment: Judgment normal.     Changes consistent with rheumatoid arthritis      ASSESSMENT AND PLAN:   1.  Rheumatoid arthritis involving multi

## 2021-10-04 ENCOUNTER — TELEPHONE (OUTPATIENT)
Dept: INTERNAL MEDICINE CLINIC | Facility: CLINIC | Age: 51
End: 2021-10-04

## 2021-10-04 DIAGNOSIS — M05.79 RHEUMATOID ARTHRITIS INVOLVING MULTIPLE SITES WITH POSITIVE RHEUMATOID FACTOR (HCC): Primary | ICD-10-CM

## 2021-10-04 NOTE — TELEPHONE ENCOUNTER
Patient needs phone # information for the Home Health Referral.  Patient has the referral approved but does not know who to call. She has communicated with [facility] and they were unable to give her information. Please call patient with information.

## 2021-10-04 NOTE — TELEPHONE ENCOUNTER
Patient advised to call her Insurance and find out which Home Health is in her network. Patient will call back with info.

## 2021-10-05 NOTE — TELEPHONE ENCOUNTER
Dr. Julia Scott,    [facility] intermittent leave for spouse - same as last year     Please sign off on form:  -Highlight the patient and hit \"Chart\" button.   -In Chart Review, w/in the Encounter tab - click 1 time on the Telephone call encounter for 9/23/21 Scrol

## 2021-10-05 NOTE — TELEPHONE ENCOUNTER
Faxed LA for spouse to Jr - [phone]. Notified pt. - Mailed copy and emailed to pt: [email or web address] as requested.

## 2021-10-05 NOTE — TELEPHONE ENCOUNTER
The patient stated no one is helping her with the home health agency. I called [address] who stated they do take [facility] and if she would like we can fax the order. Please fax to [phone].     I attempted to call several time

## 2021-10-06 NOTE — TELEPHONE ENCOUNTER
The patient called back and was informed with understanding. Phone number given for [facility]. She will call them tomorrow and follow up.

## 2021-10-07 ENCOUNTER — TELEPHONE (OUTPATIENT)
Dept: INTERNAL MEDICINE CLINIC | Facility: CLINIC | Age: 51
End: 2021-10-07

## 2021-10-07 DIAGNOSIS — M05.79 RHEUMATOID ARTHRITIS INVOLVING MULTIPLE SITES WITH POSITIVE RHEUMATOID FACTOR (HCC): Primary | ICD-10-CM

## 2021-10-07 NOTE — TELEPHONE ENCOUNTER
Patient is calling to find out the status of the referral for [address] [place] [address]. [address] [place] [address] contacted at [phone] and they have not received it yet. Referral can be faxed to [phone].   Patient would like to know status of referral.

## 2021-10-08 NOTE — TELEPHONE ENCOUNTER
Spoke with patient--cannot get through to [facility]. [place] at # below--spoke with Mirna--confirmed fax # below and has received referral, but unable to see patient d/t staffing limitations in [place].     Spoke with

## 2021-10-08 NOTE — TELEPHONE ENCOUNTER
Patient call in regards to [address] Referral & asking for referral to have her battery replaced on Scooter    Stated checked with her insurance and they provided her with     [address]     [phone] phone,  Fax #

## 2021-10-11 NOTE — TELEPHONE ENCOUNTER
Patient following up regarding status of setting of [facility] services with [address]. [address] [facility] [phone] and they are a [address], they also do not assist with batteries for scooters.  New order needed fo

## 2021-10-12 NOTE — TELEPHONE ENCOUNTER
Catherine Andres with [facility] got the referral  For [address]. However, due to staffing issues and that her Dr is not within [facility] they will not be able to see this patient. Please Advise.

## 2021-10-12 NOTE — TELEPHONE ENCOUNTER
Patient was advised new referral was completed today for [facility] and referral for [facility] was cancelled. Reports she spoke with Yonas Velásquez at [facility] and they said to ensure [facility] referral was cancelled. Advised new referral is now in pending status.

## 2021-10-12 NOTE — TELEPHONE ENCOUNTER
Advised patient of [facility]'s note. Patient verbalized understanding  Patient is requesting a new home health order for [facility] in [facility] for physical therapy.    Patient's current referral is for [facility] (not cover

## 2021-10-12 NOTE — TELEPHONE ENCOUNTER
I have approved. However, she can only get home health after get it approved through [facility].

## 2021-10-12 NOTE — TELEPHONE ENCOUNTER
Called [facility], order was never received. Fax number below is incorrect. Order will be faxed to [phone].

## 2021-10-12 NOTE — TELEPHONE ENCOUNTER
Typically pt reach out to company they received scooter from. The scooter is evaluated for repairs and fax a copy of repairs needed to managed care.      Please advise pt     Thank you,  [facility]   Referral specialist

## 2021-10-13 NOTE — TELEPHONE ENCOUNTER
Patient requesting face to face notes be sent to [facility] fax# [phone]. Notes faxed patient informed. Patient also wants to know if she can get a covid vaccination as well as flu shot at home.  Patient states she has too much difficulty get

## 2021-10-13 NOTE — TELEPHONE ENCOUNTER
I am not sure how I can advise here. If she can get flu vaccine at home I am okay with that. If she can get Covid vaccine at home I am okay with that    I do not make arrangements for flu vaccine or Covid vaccine at home.   Thank you

## 2021-10-13 NOTE — TELEPHONE ENCOUNTER
Yas Blackman from [address] [address] is requesting most recent face to face notes    Fax [phone]

## 2021-10-13 NOTE — TELEPHONE ENCOUNTER
Lian Mora contacted office inquiring about status of referral, which is now Authorized. [facility] Care   FAX# [phone]  FLOWER Lopez        This RN faxed order electronically.   Chart Review Routing History Since 10/14/2020 [facility] Full Routing Histo

## 2021-10-18 ENCOUNTER — TELEPHONE (OUTPATIENT)
Dept: CASE MANAGEMENT | Age: 51
End: 2021-10-18

## 2021-10-18 NOTE — TELEPHONE ENCOUNTER
Hello~    Pt needs to reach out to the manufacture of her scooter and request a evaluation of scooter. They will come out and evaluate the scooter and then send us a packet with necessary repairs. We can then send that to the health plan.     Thank you,    [facility]

## 2021-10-19 ENCOUNTER — TELEPHONE (OUTPATIENT)
Dept: INTERNAL MEDICINE CLINIC | Facility: CLINIC | Age: 51
End: 2021-10-19

## 2021-10-19 DIAGNOSIS — M06.9 RHEUMATOID ARTHRITIS, INVOLVING UNSPECIFIED SITE, UNSPECIFIED WHETHER RHEUMATOID FACTOR PRESENT (HCC): Primary | ICD-10-CM

## 2021-10-19 NOTE — TELEPHONE ENCOUNTER
Patient called stated spoke with insurance and she is able to get new scooter    Referral pend for review/approval

## 2021-10-19 NOTE — TELEPHONE ENCOUNTER
Spoke with the patient stated she also is approved for a lift chair    Ref# [id number] Kassi Santana at [facility]     Referral pend for review/approval

## 2021-10-20 NOTE — TELEPHONE ENCOUNTER
Left message for patient to call back.  If she calls back please ask her who she uses for current DME company so referral can be placed

## 2021-10-25 NOTE — TELEPHONE ENCOUNTER
Ivan Foreman, RN 4 days ago   FK  Hello ~     Attempted to contact pt and call will not go through    Message text

## 2021-10-25 NOTE — TELEPHONE ENCOUNTER
Patient was calling for an update. Relayed that referrals are still in \"open\" status. She would like to know if there is any update.

## 2021-10-28 NOTE — TELEPHONE ENCOUNTER
I am not understanding the question here. Does it mean that she needs a mobility evaluation? Or do they need a letter stating that patient needs scooter that may improve her quality of life?   Please let me know thank you

## 2021-10-28 NOTE — TELEPHONE ENCOUNTER
Spoke with patient informed her that health plan is stating lift device is not a covered benefit. Also, health plan is needing medical necessity for DME scooter referral request. Dr. Pete Reyes please advise .       Thank you, Rafaela Morelos-Abdullahi Special

## 2021-10-28 NOTE — TELEPHONE ENCOUNTER
I have made the letter and send it to manage care. Please review it. If I have to modify, please let me know.   Thank you

## 2021-10-28 NOTE — TELEPHONE ENCOUNTER
The health plan is requesting a letter of medical necessity as to why patient is needing scooter and lift chair. The letter is needed in order patient to be approved for evaluation. Thank you, Keagan Pinedo Specialist    [facility] Care.

## 2021-11-01 NOTE — TELEPHONE ENCOUNTER
Noted, thank you Dr. Patricia Lim with [employer], please fax order to [phone]. Patient will have eval then referral will be placed for equipment. Thank you, Melissa Khoury Specialist    [facility].

## 2021-11-08 ENCOUNTER — TELEPHONE (OUTPATIENT)
Dept: RHEUMATOLOGY | Facility: CLINIC | Age: 51
End: 2021-11-08

## 2021-11-08 ENCOUNTER — OFFICE VISIT (OUTPATIENT)
Dept: RHEUMATOLOGY | Facility: CLINIC | Age: 51
End: 2021-11-08

## 2021-11-08 ENCOUNTER — LAB ENCOUNTER (OUTPATIENT)
Dept: LAB | Facility: HOSPITAL | Age: 51
End: 2021-11-08
Attending: INTERNAL MEDICINE
Payer: COMMERCIAL

## 2021-11-08 VITALS
DIASTOLIC BLOOD PRESSURE: 98 MMHG | SYSTOLIC BLOOD PRESSURE: 157 MMHG | HEART RATE: 102 BPM | HEIGHT: 64 IN | BODY MASS INDEX: 21 KG/M2

## 2021-11-08 DIAGNOSIS — D50.8 OTHER IRON DEFICIENCY ANEMIA: ICD-10-CM

## 2021-11-08 DIAGNOSIS — M05.79 RHEUMATOID ARTHRITIS INVOLVING MULTIPLE SITES WITH POSITIVE RHEUMATOID FACTOR (HCC): ICD-10-CM

## 2021-11-08 DIAGNOSIS — M05.79 RHEUMATOID ARTHRITIS INVOLVING MULTIPLE SITES WITH POSITIVE RHEUMATOID FACTOR (HCC): Primary | ICD-10-CM

## 2021-11-08 PROCEDURE — 86706 HEP B SURFACE ANTIBODY: CPT | Performed by: INTERNAL MEDICINE

## 2021-11-08 PROCEDURE — 87340 HEPATITIS B SURFACE AG IA: CPT | Performed by: INTERNAL MEDICINE

## 2021-11-08 PROCEDURE — 86704 HEP B CORE ANTIBODY TOTAL: CPT | Performed by: INTERNAL MEDICINE

## 2021-11-08 PROCEDURE — 3080F DIAST BP >= 90 MM HG: CPT | Performed by: INTERNAL MEDICINE

## 2021-11-08 PROCEDURE — 36415 COLL VENOUS BLD VENIPUNCTURE: CPT

## 2021-11-08 PROCEDURE — 86803 HEPATITIS C AB TEST: CPT | Performed by: INTERNAL MEDICINE

## 2021-11-08 PROCEDURE — 3077F SYST BP >= 140 MM HG: CPT | Performed by: INTERNAL MEDICINE

## 2021-11-08 PROCEDURE — 3008F BODY MASS INDEX DOCD: CPT | Performed by: INTERNAL MEDICINE

## 2021-11-08 PROCEDURE — 86480 TB TEST CELL IMMUN MEASURE: CPT

## 2021-11-08 PROCEDURE — 99244 OFF/OP CNSLTJ NEW/EST MOD 40: CPT | Performed by: INTERNAL MEDICINE

## 2021-11-08 RX ORDER — LEFLUNOMIDE 20 MG/1
20 TABLET ORAL DAILY
Qty: 90 TABLET | Refills: 0 | Status: SHIPPED | OUTPATIENT
Start: 2021-11-08 | End: 2022-02-07

## 2021-11-08 RX ORDER — NAPROXEN 500 MG/1
500 TABLET ORAL 2 TIMES DAILY WITH MEALS
Qty: 60 TABLET | Refills: 2 | Status: SHIPPED | OUTPATIENT
Start: 2021-11-08

## 2021-11-08 RX ORDER — PREDNISONE 10 MG/1
TABLET ORAL
Qty: 30 TABLET | Refills: 0 | Status: SHIPPED | OUTPATIENT
Start: 2021-11-08

## 2021-11-08 RX ORDER — PREDNISONE 1 MG/1
5 TABLET ORAL DAILY
Qty: 90 TABLET | Refills: 0 | Status: SHIPPED | OUTPATIENT
Start: 2021-11-08

## 2021-11-08 NOTE — PATIENT INSTRUCTIONS
You were seen today for rheumatoid arthritis  Currently had active RA  Plan to start you on leflunomide 20 mg daily  Start prednisone, take 40 mg daily for 3 days then 30 mg daily for 3 days then 20 mg daily for 3 days then 10 mg daily for 3 days then stay

## 2021-11-08 NOTE — PROGRESS NOTES
Abdelrahman Frederick is a 46year old female who presents for Patient presents with:  Consult  Rheumatoid Arthritis  .    HPI:   CC: establish care for RA  Consult: referred by PCP Dr. Clarke Rae  Previous rheumatologist Dr. Juan Ortega    This is a 47 yo F with hx of M mouth every 8 (eight) hours as needed for Pain. 60 tablet 0   • TraMADol HCl 50 MG Oral Tab Take 1 tablet (50 mg total) by mouth every 6 (six) hours as needed for Pain.  100 tablet 1   • METHOTREXATE 2.5 MG Oral Tab TAKE 8 TABLETS BY MOUTH EVERY FRIDAY WITH Mother         spinal stenosis   • Arthritis Maternal Grandmother         rheumatoid   • Arthritis Paternal Grandfather         rheumatoid   • Arthritis Sister         rheumatoid   • Bleeding Disorders Paternal Cousin Female         sickle cell anemia   • extension and flexion  Elbow: Swelling in both elbows, limited range of motion, unable to fully extend them  Shoulders: Abduction limited to 20 degrees.   TTP in both shoulders  Hip: normal log roll, no lateral hip pain, KARI test negative b/l  Knees: Nathan Caves also start leflunomide 20 mg daily. Risk/benefits discussed with patient  - Plan to also start Elsworth Catching, p.o. be needed. Wrist suspension assist with patient. No history of blood clots. Can cause anemia or reactivation of shingles.   Will need to monitor

## 2021-11-10 RX ORDER — TOFACITINIB 11 MG/1
11 TABLET, FILM COATED, EXTENDED RELEASE ORAL DAILY
Qty: 30 TABLET | Refills: 5 | Status: SHIPPED | OUTPATIENT
Start: 2021-11-10

## 2021-11-10 RX ORDER — TOFACITINIB 11 MG/1
11 TABLET, FILM COATED, EXTENDED RELEASE ORAL DAILY
Qty: 30 TABLET | Refills: 0 | Status: SHIPPED | OUTPATIENT
Start: 2021-11-10 | End: 2021-11-10

## 2021-11-10 NOTE — TELEPHONE ENCOUNTER
Left message to call office back. Ext [phone]    PA approved 11/9/2021 to 11/09/2022 case [id number]    Mailed co-pay card to pt, 30-day supply voucher sent to [facility].

## 2021-11-10 NOTE — TELEPHONE ENCOUNTER
Pt contacted and advised of approval. She was given pharmacy information/phone number. She is aware [employer] will contact her to deliver free 30 day supply of medication.

## 2021-11-12 ENCOUNTER — HOME HEALTH CHARGES (OUTPATIENT)
Dept: INTERNAL MEDICINE CLINIC | Facility: CLINIC | Age: 51
End: 2021-11-12

## 2021-11-12 ENCOUNTER — TELEPHONE (OUTPATIENT)
Dept: INTERNAL MEDICINE CLINIC | Facility: CLINIC | Age: 51
End: 2021-11-12

## 2021-11-12 DIAGNOSIS — D47.2 MGUS (MONOCLONAL GAMMOPATHY OF UNKNOWN SIGNIFICANCE): ICD-10-CM

## 2021-11-12 DIAGNOSIS — Z74.09 IMPAIRED MOBILITY AND ENDURANCE: ICD-10-CM

## 2021-11-12 DIAGNOSIS — M05.79 RHEUMATOID ARTHRITIS INVOLVING MULTIPLE SITES WITH POSITIVE RHEUMATOID FACTOR (HCC): Primary | ICD-10-CM

## 2021-11-12 DIAGNOSIS — D50.8 OTHER IRON DEFICIENCY ANEMIA: ICD-10-CM

## 2021-11-12 NOTE — PROGRESS NOTES
[facility] 12/13/0374  Certification Period 10/15/21 to 12/13/21  Total [address] [place] [address]

## 2021-11-12 NOTE — TELEPHONE ENCOUNTER
Patient called stated was advised by her PT she would need more therapy.     Patient is requesting more PT, please advised

## 2021-11-15 PROCEDURE — G0180 MD CERTIFICATION HHA PATIENT: HCPCS | Performed by: INTERNAL MEDICINE

## 2021-11-15 NOTE — TELEPHONE ENCOUNTER
Spoke with patient ( verified)--relayed Dr. Vannessa Garcia message below--patient verbalizes understanding, but requesting eight PT visits add to new Home Health PT, as she is afraid her insurance will only authorize one visit, then close the referral    Ple

## 2021-11-15 NOTE — TELEPHONE ENCOUNTER
Hello       We will need a progress note so we can add additional visits.     Thank you,  [facility]   Referral specialist

## 2021-11-15 NOTE — TELEPHONE ENCOUNTER
Patient is receiving PT from [facility]. Please fax order for additional PT visits.     [address]  [phone],   Fax: [phone]

## 2021-11-17 NOTE — TELEPHONE ENCOUNTER
I will still need a progress note to send to patients health plan.      Thank you,  [facility]   Referral specialist

## 2021-11-17 NOTE — TELEPHONE ENCOUNTER
Okay for my last progress note. If they need new progress note, please inform patient to make appointment.   Thank you

## 2021-11-17 NOTE — TELEPHONE ENCOUNTER
Verified name and . Patient calling to follow up on referral for physical therapy. Please advise whether or not patient needs new appointment or if progress notes from office visit on 21 are sufficient.     Patient would like phone call with

## 2021-11-19 NOTE — TELEPHONE ENCOUNTER
Patient calling in regards to referral.    Soft transfer to [facility] for further assistance.

## 2021-12-09 ENCOUNTER — TELEPHONE (OUTPATIENT)
Dept: CASE MANAGEMENT | Age: 51
End: 2021-12-09

## 2021-12-09 DIAGNOSIS — M05.79 SEROPOSITIVE RHEUMATOID ARTHRITIS OF MULTIPLE SITES (HCC): Primary | ICD-10-CM

## 2021-12-09 NOTE — TELEPHONE ENCOUNTER
Hello~    The health plan closed last referral because patient was [facility]. Please sign off if you agree with plan of care.      Thank you,  [facility]  Referral specialist

## 2021-12-09 NOTE — TELEPHONE ENCOUNTER
I dont know my role here  If health plan closed and pt doesn't want pay out of pocket - Then I am not sure why they need my approval    If  thinks pt has achieved max goal - then its ok to stop it     [employer]

## 2021-12-10 NOTE — TELEPHONE ENCOUNTER
Patient can be discharged from the physical therapy if patient has achieved maximum goals or if insurance stopped paying for it or if patient does not want to do home physical therapy. If home physical therapy is beneficial to patient and patient is willing to participate and insurance pays for it I am okay to continue. I am hoping that it is clear now.

## 2021-12-10 NOTE — TELEPHONE ENCOUNTER
Hello     Can someone from office clarify if Dr. Melisa Garner like patient to continue therapy? Please advise plan of care.     Thank you,    [facility]   Referral specialist

## 2021-12-13 ENCOUNTER — TELEPHONE (OUTPATIENT)
Dept: INTERNAL MEDICINE CLINIC | Facility: CLINIC | Age: 51
End: 2021-12-13

## 2021-12-13 NOTE — TELEPHONE ENCOUNTER
Please sign off if you agree with plan of care.      Thank you,  [facility]   Referral specialist

## 2021-12-13 NOTE — TELEPHONE ENCOUNTER
Patient received another call from the social security administration. They faxed the paperwork over and would like her to return their call within an hour to make sure it was received. Spoke to Chante Johnson from [facility] and they received the fax.      Advised

## 2021-12-13 NOTE — TELEPHONE ENCOUNTER
Patient calling regarding physical therapy referral. Advised her of below. She would like to continue physical therapy. She states that physical therapy told her that she still needs to be seen by them. As far as she knows her insurance will pay for it. She will wait for the approval to be signed.

## 2021-12-13 NOTE — TELEPHONE ENCOUNTER
Please give the form to [facility] tomorrow or please leave it in my desk thank

## 2021-12-13 NOTE — TELEPHONE ENCOUNTER
Patient states that she received a call from the social security administration. They have faxed forms to the office for Dr. Clarke Rae to fill out in order for her to qualify for social security.

## 2021-12-16 NOTE — TELEPHONE ENCOUNTER
Patient wants to follow up her SS form ,informed that it was completed  and was faxed yesterday 12/15/21,stated understanding. See Brian's note below.

## 2021-12-16 NOTE — TELEPHONE ENCOUNTER
Patient calling and wants to follow up her [facility] PT referral, informed that it is  still showing pended, offered [facility]/referral department but states that  she has the number and she will call them in a few days. [facility]aged [facility]=please follow up ,thanks.

## 2022-01-27 NOTE — TELEPHONE ENCOUNTER
Patient called upset that her referral for home health physical therapy has not been approved. Please update status of referral. Insurance has been updated.

## 2022-02-03 ENCOUNTER — TELEPHONE (OUTPATIENT)
Dept: INTERNAL MEDICINE CLINIC | Facility: CLINIC | Age: 52
End: 2022-02-03

## 2022-02-03 NOTE — TELEPHONE ENCOUNTER
Patient calling to request fax number. Will be faxing over paperwork for disability placard. Is requesting that provider complete sections #3 and #4 and return to patient via mail to home address.         Routing to clinical staff to assist.

## 2022-02-07 RX ORDER — LEFLUNOMIDE 20 MG/1
20 TABLET ORAL DAILY
Qty: 90 TABLET | Refills: 0 | Status: SHIPPED | OUTPATIENT
Start: 2022-02-07

## 2022-02-08 ENCOUNTER — MED REC SCAN ONLY (OUTPATIENT)
Dept: INTERNAL MEDICINE CLINIC | Facility: CLINIC | Age: 52
End: 2022-02-08

## 2022-03-01 NOTE — TELEPHONE ENCOUNTER
Seymour Berman,    I received a call from patient. Pt notes that [address] [place] [address] will not see patient because they did not receive payment from previous services. Called [facility] and wanted to clarification. Danyell at [address] [place] [address] states due to non payment from health plan they will not evaluate patient.      Please generate a new referral for patient to receive home physical therapy       Thank you,,  [facility]   Referral specialist

## 2022-03-02 NOTE — TELEPHONE ENCOUNTER
Please find out which home health will take her insurance? Please pend me the referral after we obtain information.   Diagnosis rheumatoid arthritis    Thank you

## 2022-03-08 ENCOUNTER — TELEPHONE (OUTPATIENT)
Dept: RHEUMATOLOGY | Facility: CLINIC | Age: 52
End: 2022-03-08

## 2022-03-08 RX ORDER — PREDNISONE 1 MG/1
5 TABLET ORAL DAILY
Qty: 90 TABLET | Refills: 0 | Status: SHIPPED | OUTPATIENT
Start: 2022-03-08

## 2022-03-08 RX ORDER — PREDNISONE 1 MG/1
5 TABLET ORAL DAILY
Qty: 90 TABLET | Refills: 0 | Status: SHIPPED | OUTPATIENT
Start: 2022-03-08 | End: 2022-03-08

## 2022-03-08 NOTE — TELEPHONE ENCOUNTER
Patient is supposed to be on prednisone 5 mg daily. Will put a refill for 5 mg daily. She needs to follow-up also as she was last seen in October and she is on high risk medication and needs blood work monitoring.  If you can make an appointment for patient to be followed up

## 2022-03-08 NOTE — TELEPHONE ENCOUNTER
Spoke to patient, she was informed of Dr. Veronica Moreno note below. She verbalized understanding. She said she will schedule appointment once she speaks with her  as he will be the one bringing her in. She was informed Dr. Veronica Moreno dose late days and some Saturdays. She will also complete blood work 3 days prior to appointment.

## 2022-03-08 NOTE — TELEPHONE ENCOUNTER
Patient stated she requested the refill with the pharmacy five days ago. Pharmacy said it was rejected by Dr. Haydee Sanford. Patient is not sure if Dr. Haydee Sanford wants her to stop taking the medication and is requesting a return call and high priority. Patient requesting detailed message if she is unable to answer the phone.      predniSONE 10 MG Oral Tab

## 2022-03-11 ENCOUNTER — TELEPHONE (OUTPATIENT)
Dept: INTERNAL MEDICINE CLINIC | Facility: CLINIC | Age: 52
End: 2022-03-11

## 2022-03-11 NOTE — TELEPHONE ENCOUNTER
Patient states she needs new referral for home health because the agency she was referred to in December could not see her. Patient would like referral to be updated to Zeyad Warren at [facility]#[phone], P[facility]#[phone]. Patient states she spoke with Talon Kraus in the referral department and was told she could just have the previously authorized referral updated with the new information.

## 2022-03-14 NOTE — TELEPHONE ENCOUNTER
Please sign off if you agree with plan of care.      Thank you,  [facility]   Referral specialist

## 2022-03-16 NOTE — TELEPHONE ENCOUNTER
Hello     I called [facility] to obtain NPI # and was advised that if they cannot obtain auth they are declining services. I will advise patient to find a new home health provider.       Thank you,  Eli Alonzo

## 2022-03-16 NOTE — TELEPHONE ENCOUNTER
Seymour Chan,    That home health agency did not work.  Called patient health plan and was advised to try these    [facility] home health     Please let me know which one you prefer    Thank you,  [facility]   Referral specialist

## 2022-03-16 NOTE — TELEPHONE ENCOUNTER
Patient calling to follow up on referral status. Advised that new referral signed by provider. Patinet to follow up with managed care. Verbalizes understanding and agrees with plan.

## 2022-03-17 NOTE — TELEPHONE ENCOUNTER
Spoke with patient ( verified)--states she spoke with Farrah Napoles yesterday--asking if [facility] home health has been authorized. Patient states she has not preference about which home health company is approved, as long as someone does.     Sent to [place] [facility] for f/u home health authorization

## 2022-03-22 NOTE — TELEPHONE ENCOUNTER
Spoke with patient ( verified)--still waiting for new Home Health order--please see Wesson's message below and advise.     Sent to Dr. King Briscoe and Managed Care for f/u with patient

## 2022-03-25 NOTE — TELEPHONE ENCOUNTER
Patient called in would like update, she would like to start services asap. Sending on high priority.

## 2022-03-29 ENCOUNTER — TELEPHONE (OUTPATIENT)
Dept: INTERNAL MEDICINE CLINIC | Facility: CLINIC | Age: 52
End: 2022-03-29

## 2022-03-29 NOTE — TELEPHONE ENCOUNTER
Niki Toro, RN/Triage from [facility] called requesting office visit notes from the last 90 days describing pt's discipline & home placement notes. Niki Toro states visits can be completed via phone as well. Any's fax # [phone]. Any's direct # [phone]. Thank you.

## 2022-04-02 NOTE — TELEPHONE ENCOUNTER
It was for a video visit. Typically we do not send any link. When the patient logs in, we will get notification and I will sign on and that is the way she gets connected. Okay to book her for Monday at 330.   Thank you

## 2022-04-04 RX ORDER — TOFACITINIB 11 MG/1
11 TABLET, FILM COATED, EXTENDED RELEASE ORAL DAILY
Qty: 30 TABLET | Refills: 1 | Status: SHIPPED | OUTPATIENT
Start: 2022-04-04

## 2022-04-04 NOTE — TELEPHONE ENCOUNTER
Will only put a 30-day supply with 1 refill. She needs to follow-up as she was last seen in November.   She also needs blood work

## 2022-04-06 ENCOUNTER — TELEPHONE (OUTPATIENT)
Dept: INTERNAL MEDICINE CLINIC | Facility: CLINIC | Age: 52
End: 2022-04-06

## 2022-04-06 NOTE — TELEPHONE ENCOUNTER
Lisbeth/[phone] option 1 would like to know if the doctor can give order for home health services. Please, include what services the doctor would like the patient to receive. Please, also include last progress notes signed by the doctor.

## 2022-04-07 NOTE — TELEPHONE ENCOUNTER
[facility] spoke with Edilberto Quinonez,  She provided fax number   MD notes  faxed as requested to [phone] leave message for Isidro Mcguire  Please call again tomorrow

## 2022-04-07 NOTE — TELEPHONE ENCOUNTER
Spoke to patient, name and  verified. She was informed of Dr. Jarett Mackay' note below. She is asking if she can do a phone visit. She was informed those are not available. She will talk to her  and call us back to schedule a follow-up and will do lab work.

## 2022-04-08 NOTE — TELEPHONE ENCOUNTER
Boaz Saha - advocate [facility] stated subscriber ID on insurance is coming up invalid. She is requesting a return call to confirm insurance information. Requesting call today 4/8 and will be available until 5.     Ph. [phone]

## 2022-04-08 NOTE — TELEPHONE ENCOUNTER
Spoke to Ariella and provided her with what we have. I have also asked her to contact the patient. She said she did and she had to leave a message.

## 2022-04-19 ENCOUNTER — TELEPHONE (OUTPATIENT)
Dept: RHEUMATOLOGY | Facility: CLINIC | Age: 52
End: 2022-04-19

## 2022-04-20 NOTE — TELEPHONE ENCOUNTER
I am not the prescriber for this medication.   That will be her rheumatologist.  They need to contact the rheumatologist.

## 2022-04-20 NOTE — TELEPHONE ENCOUNTER
She is able to take Brittany Session, arava and prednisone together, need to just monitor her blood work, she does need to follow up, last seen in November and she will need to get blood work too

## 2022-04-20 NOTE — TELEPHONE ENCOUNTER
Left detailed message for patient to complete blood work and schedule a follow-up. Office number provided. Spoke to [address] at [facility] and he was informed of Dr. Shannon Hudson note. He said he will let patient know to schedule a follow-up and to complete blood work.

## 2022-08-19 ENCOUNTER — TELEPHONE (OUTPATIENT)
Dept: CASE MANAGEMENT | Age: 52
End: 2022-08-19

## 2022-08-19 DIAGNOSIS — Z12.11 COLON CANCER SCREENING: Primary | ICD-10-CM

## 2022-09-13 ENCOUNTER — TELEPHONE (OUTPATIENT)
Dept: INTERNAL MEDICINE CLINIC | Facility: CLINIC | Age: 52
End: 2022-09-13

## 2022-09-13 DIAGNOSIS — R92.8 ABNORMAL MAMMOGRAM: Primary | ICD-10-CM

## 2022-09-13 DIAGNOSIS — Z00.00 ADULT GENERAL MEDICAL EXAM: ICD-10-CM

## 2022-09-13 NOTE — TELEPHONE ENCOUNTER
Pt states that Dr. Courtney Irizarry wanted her to have a repeat mammogram. There is no order in the system. Pt would also like to know if the doctor would like her to do any blood work. Please, call pt when the orders is entered.

## 2022-09-15 NOTE — TELEPHONE ENCOUNTER
Labs and mammogram ordered. She also need Pap smear. Please let her follow-up with her gynecologist.  I have given her stool cards. Please submit the stool card as well. If she prefers colonoscopy, I can place an order for gastroenterology.   Thank you

## 2022-09-15 NOTE — TELEPHONE ENCOUNTER
Called patient and left message to call back. When patient calls back, please relay Dr. Amairani Dillon message below.

## 2022-09-19 ENCOUNTER — TELEPHONE (OUTPATIENT)
Dept: INTERNAL MEDICINE CLINIC | Facility: CLINIC | Age: 52
End: 2022-09-19

## 2022-09-19 NOTE — TELEPHONE ENCOUNTER
Patient is requesting a callback to discuss her getting in home services such labs drawn, etc due to limited ability to get to the Dr office.

## 2022-09-23 ENCOUNTER — TELEPHONE (OUTPATIENT)
Dept: ADMINISTRATIVE | Age: 52
End: 2022-09-23

## 2022-09-23 NOTE — TELEPHONE ENCOUNTER
Received call from pt states will be sending over [facility] recert forms. No chances everything is the same. [facility] is for her spouse she is the pt.

## 2022-10-05 ENCOUNTER — TELEPHONE (OUTPATIENT)
Dept: INTERNAL MEDICINE CLINIC | Facility: CLINIC | Age: 52
End: 2022-10-05

## 2022-10-05 NOTE — TELEPHONE ENCOUNTER
Letter and Stool collection kit were mailed to patient. Patient contacted informed. Patient verbalized understanding.

## 2022-10-05 NOTE — TELEPHONE ENCOUNTER
Verified name and . Patient was seen via telemedicine appointment with Dr. Baldo Buck on 22 during which letter was requested- she states she has not received letter yet at this time. Letter sent via [address] mail. She also states that Dr. Baldo Buck had notified her that the office would send her a stool test card for colon cancer screening. Office staff, please assist and thank you.

## 2022-11-09 ENCOUNTER — TELEPHONE (OUTPATIENT)
Dept: INTERNAL MEDICINE CLINIC | Facility: CLINIC | Age: 52
End: 2022-11-09

## 2022-11-09 NOTE — TELEPHONE ENCOUNTER
[facility] is following up on lab order for colon cancer screening that was mailed to pt. Please encourage pt to complete this test within the next 2 wks. Discussed w/patient and she verbalized undetrstanding.

## 2023-01-18 DIAGNOSIS — M05.79 RHEUMATOID ARTHRITIS INVOLVING MULTIPLE SITES WITH POSITIVE RHEUMATOID FACTOR (HCC): ICD-10-CM

## 2023-01-18 DIAGNOSIS — D50.8 OTHER IRON DEFICIENCY ANEMIA: ICD-10-CM

## 2023-01-18 NOTE — TELEPHONE ENCOUNTER
Patient is calling requesting a refill on the following medication. She also stated that she hasn't had it in a while. She was asking if its possible to do a 2 month supply.       naproxen 500 MG Oral Tab  acetaminophen (TYLENOL EXTRA STRENGTH) 500 MG Oral Tab

## 2023-01-19 RX ORDER — NAPROXEN 500 MG/1
500 TABLET ORAL 2 TIMES DAILY WITH MEALS
Qty: 60 TABLET | Refills: 2 | Status: SHIPPED | OUTPATIENT
Start: 2023-01-19

## 2023-01-19 RX ORDER — ACETAMINOPHEN 500 MG
500 TABLET ORAL EVERY 8 HOURS PRN
Qty: 60 TABLET | Refills: 2 | Status: SHIPPED | OUTPATIENT
Start: 2023-01-19

## 2023-01-19 NOTE — TELEPHONE ENCOUNTER
Please review. Protocol passed but last refilled by rheumatologist.    Requested Prescriptions   Pending Prescriptions Disp Refills    naproxen 500 MG Oral Tab 60 tablet 2     Sig: Take 1 tablet (500 mg total) by mouth 2 (two) times daily with meals. Non-Narcotic Pain Medication Protocol Passed - 1/18/2023  4:12 PM        Passed - In person appointment or virtual visit in the past 6 mos or appointment in next 3 mos     Recent Outpatient Visits              3 months ago Abnormal mammogram    [facility], [address] [facility] [place], [place]    Telemedicine    9 months ago Rheumatoid arthritis involving multiple sites with positive rheumatoid factor ([facility]    [address], [address], [place]    Telemedicine    1 year ago Rheumatoid arthritis involving multiple sites with positive rheumatoid factor ([facility]    [address], [address], Nory Vanessa MD    Office Visit    1 year ago Adult general medical exam    [address], [address], [place]    Office Visit    2 years ago Breast cancer screening by mammogram    [address] [place] [address], [place]    Office Visit          Future Appointments         Provider Department Appt Notes    In 3 weeks Rand Dominguez MD [address], [address], [place] Patient asking for a # reminder                 acetaminophen (TYLENOL EXTRA STRENGTH) 500 MG Oral Tab 60 tablet 0     Sig: Take 1 tablet (500 mg total) by mouth every 8 (eight) hours as needed for Pain.        There is no refill protocol information for this order          Recent Outpatient Visits              3 months ago Abnormal mammogram    [place] MD    Telemedicine    9 months ago Rheumatoid arthritis involving multiple sites with positive rheumatoid factor ([facility] [place] Vazquez MD    Telemedicine    1 year ago Rheumatoid arthritis involving multiple sites with positive rheumatoid factor [facility])    [facility], [address], [facility] Nory Araujo MD    Office Visit    1 year ago Adult general medical exam    [place] Vazquez MD    Office Visit    2 years ago Breast cancer screening by mammogram    [place] Vazquez MD    Office Visit            Future Appointments         Provider Department Appt Notes    In 3 weeks MD Minnie Lang, [address], [place] Patient asking for a # reminder

## 2023-01-24 DIAGNOSIS — M05.79 RHEUMATOID ARTHRITIS INVOLVING MULTIPLE SITES WITH POSITIVE RHEUMATOID FACTOR (HCC): ICD-10-CM

## 2023-01-24 DIAGNOSIS — D50.8 OTHER IRON DEFICIENCY ANEMIA: ICD-10-CM

## 2023-01-24 DIAGNOSIS — Z51.81 MEDICATION MONITORING ENCOUNTER: Primary | ICD-10-CM

## 2023-01-24 NOTE — TELEPHONE ENCOUNTER
Dear staff,    Please kindly share with patient that her transferrin receptor level is low which shows that her anemia is due to chronic disease  We will follow up her blood counts in clinic and decide further management plan  Thanks  Disp Refills Start End    leflunomide 20 MG Oral Tab 90 tablet 0 2/7/2022       LOV: 11/8/21  Future Appointments   Date Time Provider Jasper Villa   2/15/2023  1:40 PM Travis Marcus MD [facility] [address]     Labs: 11/8/21

## 2023-01-25 RX ORDER — LEFLUNOMIDE 20 MG/1
20 TABLET ORAL DAILY
Qty: 90 TABLET | Refills: 0 | OUTPATIENT
Start: 2023-01-25

## 2023-01-25 NOTE — TELEPHONE ENCOUNTER
She needs to follow-up, last seen in November 2021. Also never got blood work while she was on leflunomide.   I will not refill until she sees me

## 2023-01-26 ENCOUNTER — TELEPHONE (OUTPATIENT)
Dept: INTERNAL MEDICINE CLINIC | Facility: CLINIC | Age: 53
End: 2023-01-26

## 2023-01-27 ENCOUNTER — TELEPHONE (OUTPATIENT)
Dept: INTERNAL MEDICINE CLINIC | Facility: CLINIC | Age: 53
End: 2023-01-27

## 2023-01-27 NOTE — TELEPHONE ENCOUNTER
Called patient informed her by v/m we have not received anything as of now. Advised to call back with Info so we could request report from the facility.

## 2023-01-27 NOTE — TELEPHONE ENCOUNTER
[id number]/[phone] ext [phone] states that they received an order for a bilateral knee brace and they would like the ICD codes and date of injury or last surgery date. Please, fax to [phone].

## 2023-01-27 NOTE — TELEPHONE ENCOUNTER
Spoke to patient, states she has trouble in the winter with her pain. Would like to ask if a video appointment is okay? Also informed patient to have labs completed.

## 2023-01-30 NOTE — TELEPHONE ENCOUNTER
Per patient the company who is faxing about her knee brace is faxing the form today and an [phone].

## 2023-01-30 NOTE — TELEPHONE ENCOUNTER
Left message for patient to call back. If she calls back please verify if she is requesting bilateral knee brace. If so we need to know date of last injury or last surgery date.

## 2023-01-31 NOTE — TELEPHONE ENCOUNTER
Patient will have labs completed, appt for 2/15 changed to video visit. She is aware order is in the system.

## 2023-02-03 NOTE — TELEPHONE ENCOUNTER
Gege Waldron of [address] [place] [address]; [phone] ext [phone]  Fax: [phone]    Just faxed over the scripts for two knee braces, and a note request, and the pt's letter to the doctor. Please complete and fax back. The first time it was faxed on Jan. 31st.  The patient said our office told her we never received anything. So Trang just refaxed.

## 2023-02-06 ENCOUNTER — MED REC SCAN ONLY (OUTPATIENT)
Dept: INTERNAL MEDICINE CLINIC | Facility: CLINIC | Age: 53
End: 2023-02-06

## 2023-03-16 ENCOUNTER — TELEPHONE (OUTPATIENT)
Facility: CLINIC | Age: 53
End: 2023-03-16

## 2023-03-17 NOTE — TELEPHONE ENCOUNTER
I know it is hard for her to come for the office visit. Last office visit was in 2021. I would like her to make an appointment to see me in the next 4 weeks. If she prefers Saturday, we can arrange for that as well. During the visit, I would like her to complete the blood test, please bring the stool card back. (Please mail her stool card so that she can bring it back during the office visit)  Hopefully she can also arrange for her mammogram during the same visit. She also needs to see gynecologist.  Please provide her with the name and phone number for Kimberly Guerra.   Thank you

## 2023-03-17 NOTE — TELEPHONE ENCOUNTER
Called patient advised by v/m to call back and schedule an appt. For her [de-identified] notified her I will be mailing her a stool card by mail and to make sure she drops it off when she gets her Blood test done.

## 2023-06-16 ENCOUNTER — TELEPHONE (OUTPATIENT)
Dept: INTERNAL MEDICINE CLINIC | Facility: CLINIC | Age: 53
End: 2023-06-16

## 2023-06-16 DIAGNOSIS — Z12.11 SCREEN FOR COLON CANCER: Primary | ICD-10-CM

## 2023-06-16 NOTE — TELEPHONE ENCOUNTER
Patient called to inform will be completing mammogram on Monday and would like to complete the sample where you have to go home and then bring back the sample was unable to provide the name for the order but wanted it to be ready for 06/19

## 2023-06-17 NOTE — TELEPHONE ENCOUNTER
I do not think Cologuard is covered by [facility]. Please double check. If they do not cover Cologuard, options include stool testing or colonoscopy.   If they cover, please let me know I will order for it

## 2023-06-19 ENCOUNTER — HOSPITAL ENCOUNTER (OUTPATIENT)
Dept: ULTRASOUND IMAGING | Facility: HOSPITAL | Age: 53
Discharge: HOME | End: 2023-06-19
Attending: INTERNAL MEDICINE
Payer: COMMERCIAL

## 2023-06-19 ENCOUNTER — HOSPITAL ENCOUNTER (OUTPATIENT)
Dept: MAMMOGRAPHY | Facility: HOSPITAL | Age: 53
Discharge: HOME | End: 2023-06-19
Attending: INTERNAL MEDICINE
Payer: COMMERCIAL

## 2023-06-19 ENCOUNTER — LAB ENCOUNTER (OUTPATIENT)
Dept: LAB | Facility: HOSPITAL | Age: 53
End: 2023-06-19
Attending: INTERNAL MEDICINE
Payer: COMMERCIAL

## 2023-06-19 DIAGNOSIS — Z00.00 ADULT GENERAL MEDICAL EXAM: ICD-10-CM

## 2023-06-19 DIAGNOSIS — M05.79 RHEUMATOID ARTHRITIS INVOLVING MULTIPLE SITES WITH POSITIVE RHEUMATOID FACTOR (HCC): ICD-10-CM

## 2023-06-19 DIAGNOSIS — Z12.11 COLON CANCER SCREENING: ICD-10-CM

## 2023-06-19 DIAGNOSIS — Z51.81 MEDICATION MONITORING ENCOUNTER: ICD-10-CM

## 2023-06-19 DIAGNOSIS — R92.8 ABNORMAL MAMMOGRAM: ICD-10-CM

## 2023-06-19 LAB
ALBUMIN SERPL-MCNC: 2.5 G/DL (ref 3.4–5)
ALBUMIN/GLOB SERPL: 0.4 {RATIO} (ref 1–2)
ALP LIVER SERPL-CCNC: 81 U/L
ALT SERPL-CCNC: 18 U/L
ANION GAP SERPL CALC-SCNC: 5 MMOL/L (ref 0–18)
AST SERPL-CCNC: 14 U/L (ref 15–37)
BASOPHILS # BLD AUTO: 0.05 X10(3) UL (ref 0–0.2)
BASOPHILS NFR BLD AUTO: 0.7 %
BILIRUB SERPL-MCNC: 0.2 MG/DL (ref 0.1–2)
BUN BLD-MCNC: 19 MG/DL (ref 7–18)
BUN/CREAT SERPL: 43.2 (ref 10–20)
CALCIUM BLD-MCNC: 9.4 MG/DL (ref 8.5–10.1)
CHLORIDE SERPL-SCNC: 107 MMOL/L (ref 98–112)
CHOLEST SERPL-MCNC: 147 MG/DL (ref ?–200)
CO2 SERPL-SCNC: 28 MMOL/L (ref 21–32)
CREAT BLD-MCNC: 0.44 MG/DL
CRP SERPL-MCNC: 9.23 MG/DL (ref ?–0.3)
DEPRECATED RDW RBC AUTO: 53.5 FL (ref 35.1–46.3)
EOSINOPHIL # BLD AUTO: 0.18 X10(3) UL (ref 0–0.7)
EOSINOPHIL NFR BLD AUTO: 2.4 %
ERYTHROCYTE [DISTWIDTH] IN BLOOD BY AUTOMATED COUNT: 20.5 % (ref 11–15)
ERYTHROCYTE [SEDIMENTATION RATE] IN BLOOD: 130 MM/HR
FASTING PATIENT LIPID ANSWER: NO
FASTING STATUS PATIENT QL REPORTED: NO
GFR SERPLBLD BASED ON 1.73 SQ M-ARVRAT: 116 ML/MIN/1.73M2 (ref 60–?)
GLOBULIN PLAS-MCNC: 6.2 G/DL (ref 2.8–4.4)
GLUCOSE BLD-MCNC: 124 MG/DL (ref 70–99)
HCT VFR BLD AUTO: 27.9 %
HDLC SERPL-MCNC: 40 MG/DL (ref 40–59)
HEMOCCULT STL QL: NEGATIVE
HGB BLD-MCNC: 7.7 G/DL
IMM GRANULOCYTES # BLD AUTO: 0.02 X10(3) UL (ref 0–1)
IMM GRANULOCYTES NFR BLD: 0.3 %
LDLC SERPL CALC-MCNC: 90 MG/DL (ref ?–100)
LYMPHOCYTES # BLD AUTO: 1.93 X10(3) UL (ref 1–4)
LYMPHOCYTES NFR BLD AUTO: 25.8 %
MCH RBC QN AUTO: 20.8 PG (ref 26–34)
MCHC RBC AUTO-ENTMCNC: 27.6 G/DL (ref 31–37)
MCV RBC AUTO: 75.4 FL
MONOCYTES # BLD AUTO: 0.5 X10(3) UL (ref 0.1–1)
MONOCYTES NFR BLD AUTO: 6.7 %
NEUTROPHILS # BLD AUTO: 4.81 X10 (3) UL (ref 1.5–7.7)
NEUTROPHILS # BLD AUTO: 4.81 X10(3) UL (ref 1.5–7.7)
NEUTROPHILS NFR BLD AUTO: 64.1 %
NONHDLC SERPL-MCNC: 107 MG/DL (ref ?–130)
OSMOLALITY SERPL CALC.SUM OF ELEC: 294 MOSM/KG (ref 275–295)
PLATELET # BLD AUTO: 782 10(3)UL (ref 150–450)
POTASSIUM SERPL-SCNC: 3.4 MMOL/L (ref 3.5–5.1)
PROT SERPL-MCNC: 8.7 G/DL (ref 6.4–8.2)
RBC # BLD AUTO: 3.7 X10(6)UL
SODIUM SERPL-SCNC: 140 MMOL/L (ref 136–145)
TRIGL SERPL-MCNC: 88 MG/DL (ref 30–149)
TSI SER-ACNC: 0.49 MIU/ML (ref 0.36–3.74)
VLDLC SERPL CALC-MCNC: 14 MG/DL (ref 0–30)
WBC # BLD AUTO: 7.5 X10(3) UL (ref 4–11)

## 2023-06-19 PROCEDURE — 85025 COMPLETE CBC W/AUTO DIFF WBC: CPT | Performed by: INTERNAL MEDICINE

## 2023-06-19 PROCEDURE — 36415 COLL VENOUS BLD VENIPUNCTURE: CPT | Performed by: INTERNAL MEDICINE

## 2023-06-19 PROCEDURE — 84443 ASSAY THYROID STIM HORMONE: CPT

## 2023-06-19 PROCEDURE — 82274 ASSAY TEST FOR BLOOD FECAL: CPT

## 2023-06-19 PROCEDURE — 77062 BREAST TOMOSYNTHESIS BI: CPT | Performed by: INTERNAL MEDICINE

## 2023-06-19 PROCEDURE — 80053 COMPREHEN METABOLIC PANEL: CPT

## 2023-06-19 PROCEDURE — 76642 ULTRASOUND BREAST LIMITED: CPT | Performed by: INTERNAL MEDICINE

## 2023-06-19 PROCEDURE — 77066 DX MAMMO INCL CAD BI: CPT | Performed by: INTERNAL MEDICINE

## 2023-06-19 PROCEDURE — 83036 HEMOGLOBIN GLYCOSYLATED A1C: CPT

## 2023-06-19 PROCEDURE — 86140 C-REACTIVE PROTEIN: CPT | Performed by: INTERNAL MEDICINE

## 2023-06-19 PROCEDURE — 80061 LIPID PANEL: CPT

## 2023-06-19 PROCEDURE — 85652 RBC SED RATE AUTOMATED: CPT | Performed by: INTERNAL MEDICINE

## 2023-06-20 ENCOUNTER — TELEPHONE (OUTPATIENT)
Dept: INTERNAL MEDICINE CLINIC | Facility: CLINIC | Age: 53
End: 2023-06-20

## 2023-06-20 DIAGNOSIS — M05.79 SEROPOSITIVE RHEUMATOID ARTHRITIS OF MULTIPLE SITES (HCC): ICD-10-CM

## 2023-06-20 DIAGNOSIS — D50.8 OTHER IRON DEFICIENCY ANEMIA: Primary | ICD-10-CM

## 2023-06-20 RX ORDER — FERROUS SULFATE 325(65) MG
325 TABLET ORAL 2 TIMES DAILY
Qty: 180 TABLET | Refills: 0 | Status: SHIPPED | OUTPATIENT
Start: 2023-06-20 | End: 2023-06-23

## 2023-06-20 NOTE — TELEPHONE ENCOUNTER
Pt asking would like to know what Dr. Timothy Bosworth recommends for her rheumatoid arthritis and pain in knees, besides naproxen. Please advise. Pt requesting for Ferrous Sulfate to be transferred to [place], [place] and to remove [employer] from file. Rx transferred.

## 2023-06-20 NOTE — TELEPHONE ENCOUNTER
I do not treat rheumatoid arthritis. She needs to see a rheumatologist either Dr. Daniella Merlos or Dr. Jelani Lo to treat rheumatoid arthritis. My apologies for the inconvenience.

## 2023-06-20 NOTE — TELEPHONE ENCOUNTER
Dr. Monica Mann     Before we call the patient, did you want to place a referral from the Rheumatologist ?  [address] [place] [address] was with Jason Dumas  on 11/8/2021    Referral pended for your review, completion and approval.

## 2023-06-20 NOTE — TELEPHONE ENCOUNTER
Please let patient know that my recommendation is to get colonoscopy. I have placed the order for Cologuard as per her request.  Please be aware that if it is not covered with insurance, she will end up with the bill.

## 2023-06-21 LAB — HGBA1C: 5.5 %

## 2023-06-21 NOTE — TELEPHONE ENCOUNTER
Rheum referral placed yesterday by provider:  Referral Type: RHEUMATOLOGY - INTERNAL  [phone] Dx: Seropositive rheumatoid arthritis of multiple sites ([facility] (M05.79)     Left message to call back; patient not MyChart active [1st attempt]

## 2023-06-22 ENCOUNTER — TELEPHONE (OUTPATIENT)
Dept: RHEUMATOLOGY | Facility: CLINIC | Age: 53
End: 2023-06-22

## 2023-06-22 NOTE — TELEPHONE ENCOUNTER
If patient can be notified that I reviewed her blood work. Inflammation markers are significantly elevated,  and CRP 9.2. She was last seen by me in 2021 for rheumatoid arthritis. Supposed to be on Xeljanz and leflunomide but I did not refill them as I have not seen her since 2021.   She is to follow-up as soon as possible

## 2023-06-23 ENCOUNTER — TELEPHONE (OUTPATIENT)
Dept: INTERNAL MEDICINE CLINIC | Facility: CLINIC | Age: 53
End: 2023-06-23

## 2023-06-23 DIAGNOSIS — M05.79 RHEUMATOID ARTHRITIS INVOLVING MULTIPLE SITES WITH POSITIVE RHEUMATOID FACTOR (HCC): Primary | ICD-10-CM

## 2023-06-23 RX ORDER — FERROUS SULFATE 325(65) MG
325 TABLET ORAL 2 TIMES DAILY
Qty: 180 TABLET | Refills: 1 | Status: SHIPPED | OUTPATIENT
Start: 2023-06-23 | End: 2023-09-21

## 2023-06-23 RX ORDER — METHYLPREDNISOLONE 4 MG/1
TABLET ORAL
Qty: 1 EACH | Refills: 0 | Status: SHIPPED | OUTPATIENT
Start: 2023-06-23 | End: 2023-06-26

## 2023-06-23 RX ORDER — MELOXICAM 15 MG/1
15 TABLET ORAL DAILY
Qty: 30 TABLET | Refills: 0 | Status: SHIPPED | OUTPATIENT
Start: 2023-06-23 | End: 2023-06-26

## 2023-06-23 NOTE — TELEPHONE ENCOUNTER
Unfortunately I will not be able to treat her unless she follows up. I will prescribe a Medrol Dosepak in case she needs it for her joints but for further treatment of RA she is going to have to follow-up when she can.

## 2023-06-23 NOTE — TELEPHONE ENCOUNTER
My apologies for the confusion. It was scheduled as a telemedicine visit under my chart. Typically, she she checks in and then I will get a notification. No worries,  I will schedule her for another telemedicine visit next week. I have sent meloxicam to the pharmacy. Please be aware that these medications can cause bleeding and can cause kidney damage. Please make sure she takes the medicine with food. I really would like her to be seen by hematology, rheumatologist.  Please schedule it. I have sent a referral for home health to do home PT. If she needs anything else, please let me know.   Thank you

## 2023-06-23 NOTE — TELEPHONE ENCOUNTER
Name and  verified. Results and recommendations given per provider. Pt verbalized understanding and stated it will be \"no time soon\" she can come in to office. She stated is disabled and  has to nhi her to her appointments. Pt stated she is willing to do a video visit but it looks like she is not My Chart active. Please advise.        Future Appointments   Date Time Provider Jasper Villa   8/3/2023 12:15 PM Dereck Sharif MD [facility]

## 2023-06-23 NOTE — TELEPHONE ENCOUNTER
Verified name and . Patient states that she had a telemedicine appointment scheduled with Dr. Bernabe Sweeney for yesterday 23 at 4:00 pm but never received a link for the appointment. She states that she called at 4:08 pm and was advised that the doctor was running late and that he would be sending link or calling soon. She states she called again at 4:45 pm and spoke with the answering service who also told her that if she has an appointment scheduled, that the doctor should be sending the link or calling her soon. Patient states that she waited all the way until 7:00 pm and still did not receive and link or phone call from Dr. Bernabe Sweeney.    Patient states that she takes Naproxen for arthritis pain, however, that she does not get much relief from that. She is asking for a prescription for Meloxicam or any other pain medication that is stronger than the Naproxen to help with her bilateral knee and hip pain. She states that she is aware that she has been referred to see a rheumatologist but that she cannot make an appointment to see the rheumatologist yet at this time and is requesting pain medication in the meantime until she is able to make an appointment with the rheumatologist.    She also states that she is ready to start in-home occupational therapy and physical therapy. She is requesting that Dr. Bernabe Sweeney place referrals for these therapies. She states she will complete occupational therapy first and then complete physical therapy so she is not overwhelmed by doing both at the same time. She states that if Dr. Bernabe Sweeney wants to schedule another telemedicine appointment, she is willing to do that, however, she does ask that the pain medication prescription be sent to the pharmacy first to help alleviate her pain.         Future Appointments   Date Time Provider Jasper Villa   8/3/2023 12:15 PM Sina Rome MD [facility]

## 2023-06-23 NOTE — TELEPHONE ENCOUNTER
TANESHA, I communicated with Jose R Swann, , and she reports that because the patient does not have a [employer] account, the video appointment was set up as a video link and patient would have needed to receive the link from the provider once he was ready. I asked Kevin Davidson, billing customer service, to review chart to ensure pt is not assessed a 'no show fee' as result of the confusion. She said right now, one was not added but she will monitor over next couple of days.      Pt has another video link appt set up with Dr. Hailee Booth for Monday, 6/26 1:45 PM

## 2023-06-23 NOTE — TELEPHONE ENCOUNTER
Patient was advised of 's notes and verbalized understanding. Patient thanks . Video Link appt scheduled for Monday. Patient does not use [employer].

## 2023-06-26 ENCOUNTER — TELEMEDICINE (OUTPATIENT)
Dept: INTERNAL MEDICINE CLINIC | Facility: CLINIC | Age: 53
End: 2023-06-26

## 2023-06-26 VITALS — DIASTOLIC BLOOD PRESSURE: 84 MMHG | SYSTOLIC BLOOD PRESSURE: 124 MMHG

## 2023-06-26 DIAGNOSIS — D50.9 IRON DEFICIENCY ANEMIA, UNSPECIFIED IRON DEFICIENCY ANEMIA TYPE: Primary | ICD-10-CM

## 2023-06-26 DIAGNOSIS — Z12.4 SCREENING FOR CERVICAL CANCER: ICD-10-CM

## 2023-06-26 DIAGNOSIS — M05.79 RHEUMATOID ARTHRITIS INVOLVING MULTIPLE SITES WITH POSITIVE RHEUMATOID FACTOR (HCC): ICD-10-CM

## 2023-06-26 PROCEDURE — 99214 OFFICE O/P EST MOD 30 MIN: CPT | Performed by: INTERNAL MEDICINE

## 2023-06-26 RX ORDER — MELOXICAM 15 MG/1
15 TABLET ORAL DAILY
Qty: 90 TABLET | Refills: 0 | Status: SHIPPED | OUTPATIENT
Start: 2023-06-26 | End: 2023-09-24

## 2023-06-26 NOTE — PROGRESS NOTES
Virtual Virtual Check-In    Jing Camara verbally consents to a Virtual Video Check-In service on 06/26/23. Patient understands and accepts financial responsibility for any deductible, co-insurance and/or co-pays associated with this service. This visit is conducted using Telemedicine with live, interactive video and audio. I spoke with Jing Camara by secure video chat, verified date of birth, and discussed their current concerns:   Duration: 22 minutes    HPI  51-year-old pleasant lady requesting a telemedicine consultation for chronic condition that includes iron deficient anemia, rheumatoid arthritis and for follow-up of abnormal labs. She informed me that she is doing okay. Her pain is better controlled with meloxicam.  I had a lengthy discussion with her regarding the importance of rheumatology, hematology follow-ups. She denies any bleeding. She started taking iron supplements and started to feel better. Denies any melena, blood in the stool or blood in the urine. She is also due for her Pap smear. Review of Systems:   Review of Systems   Constitutional:  Negative for activity change, appetite change and fever. HENT:  Negative for congestion and voice change. Respiratory:  Negative for cough and shortness of breath. Cardiovascular:  Negative for chest pain. Gastrointestinal:  Negative for abdominal distention, abdominal pain and vomiting. Genitourinary:  Negative for hematuria. Musculoskeletal:  Positive for arthralgias and gait problem. Skin:  Negative for wound. Psychiatric/Behavioral:  Negative for behavioral problems.          Reviewed Active Problems:  Patient Active Problem List    Impaired mobility and endurance      Rheumatoid arthritis involving multiple sites with positive rheumatoid factor (HCC)            Dr Xi Isbell for therapeutic drug monitoring      Iron deficiency anemia      MGUS (monoclonal gammopathy of unknown significance)       Reviewed Past Medical History:  Past Medical History:   Diagnosis Date    Lipid screening 04/28/2014    per NG    MGUS (monoclonal gammopathy of unknown significance) 4/2014    Osteoporosis screening 04/28/2014    per NG    Rheumatoid arthritis ([address] 1995      Reviewed Family History:  Family History   Problem Relation Age of Onset    Prostate Cancer Father     Diabetes Father     Hypertension Mother     Musculo-skelatal Disorder Mother         spinal stenosis    Arthritis Maternal Grandmother         rheumatoid    Arthritis Paternal Grandfather         rheumatoid    Arthritis Sister         rheumatoid    Bleeding Disorders Paternal Cousin Female         sickle cell anemia    Clotting Disorder Neg        Reviewed Social History:  Social History     Socioeconomic History    Marital status:    Tobacco Use    Smoking status: Never    Smokeless tobacco: Never   Vaping Use    Vaping Use: Never used   Substance and Sexual Activity    Alcohol use: No     Alcohol/week: 0.0 standard drinks of alcohol    Drug use: No   Other Topics Concern    Caffeine Concern Yes     Comment: coffee/tea/soda - 16 oz. Social History Narrative    Patient is ; for 27 yrs, spouse Jono Hodges. She, , and son live in [place], [place]. Nirmal Iglesias is unable to work due to her rheumatoid arthritis. Reviewed Current Medications:  Current Outpatient Medications   Medication Sig Dispense Refill    Meloxicam 15 MG Oral Tab Take 1 tablet (15 mg total) by mouth daily. With foods only 90 tablet 0    Ferrous Sulfate 325 (65 Fe) MG Oral Tab Take 1 tablet (325 mg total) by mouth 2 (two) times daily. 180 tablet 1    acetaminophen (TYLENOL EXTRA STRENGTH) 500 MG Oral Tab Take 1 tablet (500 mg total) by mouth every 8 (eight) hours as needed for Pain. 60 tablet 2    Tofacitinib Citrate ER (XELJANZ XR) 11 MG Oral Tablet 24 Hr Take 11 mg by mouth daily. 30 tablet 1    predniSONE 5 MG Oral Tab Take 1 tablet (5 mg total) by mouth daily.  90 tablet 0    leflunomide 20 MG Oral Tab Take 1 tablet (20 mg total) by mouth daily. 90 tablet 0    TraMADol HCl 50 MG Oral Tab Take 1 tablet (50 mg total) by mouth every 6 (six) hours as needed for Pain. 100 tablet 1          Physical Exam   06/26/23  1410   BP: 124/84     Physical Exam   Constitutional:       General: He is not in acute distress. Appearance: Normal appearance. HENT:      Head: Normocephalic and atraumatic. Nose: Nose normal.   Neurological:      Mental Status: He is alert and oriented to person, place, and time. Psychiatric:         Behavior: Behavior normal.         Thought Content: Thought content normal.         Judgment: Judgment normal.       Diagnosis:  1. Iron deficiency anemia, unspecified iron deficiency anemia type  I have reviewed her previous blood test.  She does have iron deficiency anemia. Resumed iron supplements. She was seen by Dr. Quan Stevenson in the past.  I have encouraged her to make an appointment. We will repeat CBC.  - CBC, PLATELET; NO DIFFERENTIAL; Future    2. Rheumatoid arthritis involving multiple sites with positive rheumatoid factor ([facility] [address]  I had a lengthy discussion with her regarding the importance of rheumatology follow-up. She needs to be on medications. She will call and make appointment today discussed. She is aware of the risk associated to the progression of her rheumatoid arthritis. - RHEUMATOLOGY - INTERNAL    3. Screening for cervical cancer    - OBG - INTERNAL     Plan: Labs reviewed with the patient. Medication prescribed. Referral given. Follow up: No follow-ups on file. Please note that the following visit was completed using two-way, real-time interactive audio and/or video communication. This has been done in good earlene to provide continuity of care in the best interest of the provider-patient relationship, due to the ongoing public health crisis/national emergency and because of restrictions of visitation.   There are limitations of this visit as no physical exam could be performed. Every conscious effort was taken to allow for sufficient and adequate time. This billing was spent on reviewing labs, medications, radiology tests and decision making. Appropriate medical decision-making and tests are ordered as detailed in the plan of care above. eKvin Romero understands video evaluation is not a substitute for in person examination or emergency care. Patient advised to go to ER or call 911 for worsening symptoms or acute distress. This note was prepared using [employer] voice recognition dictation software. As a result errors may occur. When identified these errors have been corrected.  While every attempt is made to correct errors during dictation discrepancies may still exist.  Smita Santos MD

## 2023-08-08 ENCOUNTER — TELEPHONE (OUTPATIENT)
Dept: RHEUMATOLOGY | Facility: CLINIC | Age: 53
End: 2023-08-08

## 2023-08-08 NOTE — TELEPHONE ENCOUNTER
Patient has appointment on tomorrow on 8/9 at 12:00 PM and requesting for a Telehealth appointment due to transportation issue. Patient indicates if it can not be a telehealth appointment, please cancel. Please call to update today at [phone], thanks.

## 2023-08-09 ENCOUNTER — APPOINTMENT (OUTPATIENT)
Dept: HEMATOLOGY/ONCOLOGY | Facility: HOSPITAL | Age: 53
End: 2023-08-09
Attending: INTERNAL MEDICINE
Payer: COMMERCIAL

## 2023-08-09 NOTE — TELEPHONE ENCOUNTER
Left message for pt to call office back and reschedule appointment. Her last office visit was 11/8/2021 was a consult and she need to follow up in office per provider.

## 2023-08-10 NOTE — TELEPHONE ENCOUNTER
Patient called back and wanted to schedule an appt with . I informed the patient that the doctors do not share patients as she would NOT be considered a new patient because she was seen less than three years ago. She started getting upset with me when I informed her of this and she stated that its crazy and that she can't be seen by  and tried to tell me how to do my job telling me to call her pcp and request a referral to see  for further appts and so on.

## 2023-08-10 NOTE — TELEPHONE ENCOUNTER
Left message for pt to schedule a consult appointment with Dr. Wilbur Pope. It is the clinic/provider policy when tranfering care from one provider to the other.

## 2023-09-11 ENCOUNTER — TELEPHONE (OUTPATIENT)
Dept: INTERNAL MEDICINE CLINIC | Facility: CLINIC | Age: 53
End: 2023-09-11

## 2023-09-20 ENCOUNTER — TELEPHONE (OUTPATIENT)
Dept: INTERNAL MEDICINE CLINIC | Facility: CLINIC | Age: 53
End: 2023-09-20

## 2023-09-20 RX ORDER — MELOXICAM 15 MG/1
15 TABLET ORAL 2 TIMES DAILY
Qty: 180 TABLET | Refills: 0 | Status: CANCELLED | OUTPATIENT
Start: 2023-09-20 | End: 2023-12-19

## 2023-09-20 NOTE — TELEPHONE ENCOUNTER
Patient was notified with understanding. She continues to ask what else she can take for her joint. pain. Cannot see new rheumatologist until November (prefers not to go back to Dr. Karen Guerra). I did advise her to speak with rheumatology clinical staff which she says she has. Requesting Dr. Gloria Traylor assistance.

## 2023-09-20 NOTE — LETTER
10/29/18        Kylah Banks  [address] [facility]      Dear Vito Adams,    Our records indicate that you have outstanding lab work and or testing that was ordered for you and has not yet been completed:  Clint Correa Problem: INFECTION - ADULT  Goal: Absence or prevention of progression during hospitalization  Description: INTERVENTIONS:  - Assess and monitor for signs and symptoms of infection  - Monitor lab/diagnostic results  - Monitor all insertion sites, i.e. indwelling lines, tubes, and drains  - Monitor endotracheal if appropriate and nasal secretions for changes in amount and color  - Mobile appropriate cooling/warming therapies per order  - Administer medications as ordered  - Instruct and encourage patient and family to use good hand hygiene technique  - Identify and instruct in appropriate isolation precautions for identified infection/condition  Outcome: Progressing

## 2023-09-20 NOTE — TELEPHONE ENCOUNTER
Spoke to patient. She asked if Meloxicam can be prescribed BID instead of daily. It does help her but does not last all day. If Dr. Rodney Rawls agreeable, she needs a refill. She also said that she discussed her rheumatology medications that are normally ordered by Dr. Bailey Adams with Dr. Rodney Rawls.  Dr. Rodney Rawls was going to review if he could order them until she is able to see Dr. Sienna Cedeno in November. Medication Quantity Refills Start End   Tofacitinib Citrate ER (XELJANZ XR) 11 MG Oral Tablet 24 Hr           predniSONE 5 MG Oral Tab 90 tablet       Medication Quantity Refills Start End   leflunomide 20 MG Oral Tab 90 tablet        Dr. Rodney Rawls, please review Meloxicam BID request and three rheumatology medications.

## 2023-09-20 NOTE — TELEPHONE ENCOUNTER
Meloxicam is not a medicine that you can take 2 times a day. It is a once a day medication. If she has significant pain from her rheumatoid arthritis, she needs to contact rheumatology.   Please inform thank you

## 2023-09-21 NOTE — TELEPHONE ENCOUNTER
I am not comfortable in prescribing those 3 medications. 1 option is, she can request Dr. Luli Bustos to refill medication as patient already has appointment to see her. Unfortunately, I do not prescribe those medications.   My apologies for the inconvenience

## 2023-09-22 NOTE — TELEPHONE ENCOUNTER
Spoke to patient and relayed Dr. Danielle Fermin message below. Patient verbalized understanding and had no further questions.

## 2023-10-05 ENCOUNTER — OFFICE VISIT (OUTPATIENT)
Facility: CLINIC | Age: 53
End: 2023-10-05

## 2023-10-05 ENCOUNTER — TELEPHONE (OUTPATIENT)
Dept: INTERNAL MEDICINE CLINIC | Facility: CLINIC | Age: 53
End: 2023-10-05

## 2023-10-05 VITALS
DIASTOLIC BLOOD PRESSURE: 84 MMHG | WEIGHT: 125 LBS | BODY MASS INDEX: 21.34 KG/M2 | SYSTOLIC BLOOD PRESSURE: 136 MMHG | HEART RATE: 112 BPM | RESPIRATION RATE: 14 BRPM | HEIGHT: 64 IN | OXYGEN SATURATION: 99 %

## 2023-10-05 DIAGNOSIS — Z00.00 ADULT GENERAL MEDICAL EXAM: ICD-10-CM

## 2023-10-05 DIAGNOSIS — M05.79 RHEUMATOID ARTHRITIS INVOLVING MULTIPLE SITES WITH POSITIVE RHEUMATOID FACTOR (HCC): Primary | ICD-10-CM

## 2023-10-05 DIAGNOSIS — D47.2 MGUS (MONOCLONAL GAMMOPATHY OF UNKNOWN SIGNIFICANCE): ICD-10-CM

## 2023-10-05 PROCEDURE — 3079F DIAST BP 80-89 MM HG: CPT | Performed by: INTERNAL MEDICINE

## 2023-10-05 PROCEDURE — 3075F SYST BP GE 130 - 139MM HG: CPT | Performed by: INTERNAL MEDICINE

## 2023-10-05 PROCEDURE — 99396 PREV VISIT EST AGE 40-64: CPT | Performed by: INTERNAL MEDICINE

## 2023-10-05 PROCEDURE — 3008F BODY MASS INDEX DOCD: CPT | Performed by: INTERNAL MEDICINE

## 2023-10-05 RX ORDER — MELOXICAM 15 MG/1
15 TABLET ORAL DAILY
Qty: 30 TABLET | Refills: 0 | Status: SHIPPED | OUTPATIENT
Start: 2023-10-05 | End: 2023-10-05

## 2023-10-05 RX ORDER — MELOXICAM 15 MG/1
15 TABLET ORAL DAILY
Qty: 90 TABLET | Refills: 0 | Status: SHIPPED | OUTPATIENT
Start: 2023-10-05

## 2023-10-05 RX ORDER — MELOXICAM 15 MG/1
15 TABLET ORAL DAILY
COMMUNITY
End: 2023-10-05

## 2023-10-12 NOTE — TELEPHONE ENCOUNTER
Pt called to check status of spouse fmla recert. Pt understood our turn around time. All details are the same but [facility] dates should reflect as follows: 10/18/23-10/17/24. All pt questions answered.

## 2023-10-12 NOTE — TELEPHONE ENCOUNTER
Patient would like to know if any forms were sent to [place] [address] from [facility]. She would like a follow up call regarding this. She has already spoken to the forms department regarding her [facility] paperwork but does not want confusion because [employer] may have sent the forms to the [place] [address] office and those are the wrong ones. [place] [address] staff please verify with patient.

## 2023-10-13 NOTE — TELEPHONE ENCOUNTER
Patient notified we haven't received any forms from [employer]. Per patient if we do she will like a call back.

## 2023-10-16 ENCOUNTER — TELEPHONE (OUTPATIENT)
Dept: INTERNAL MEDICINE CLINIC | Facility: CLINIC | Age: 53
End: 2023-10-16

## 2023-10-16 NOTE — TELEPHONE ENCOUNTER
Received fax requesting [facility] paper work     [employer]  Fax# [phone]    Paper work sent to forms dept

## 2023-10-17 NOTE — TELEPHONE ENCOUNTER
Pt called to check status of FMLA forms. Pt advised forms were received. Pt stated she does not want any forms faxed to [employer]. Pt is revoking signed Patricia Diljacob on file as of 10/17/23. [employer] forms moved to archive folder.

## 2023-10-18 ENCOUNTER — NURSE TRIAGE (OUTPATIENT)
Dept: INTERNAL MEDICINE CLINIC | Facility: CLINIC | Age: 53
End: 2023-10-18

## 2023-10-18 RX ORDER — METHYLPREDNISOLONE 4 MG/1
TABLET ORAL
Qty: 21 TABLET | Refills: 0 | Status: SHIPPED | OUTPATIENT
Start: 2023-10-18

## 2023-10-18 NOTE — TELEPHONE ENCOUNTER
Patient calling to see if  could order the methylPREDNISolone 4mg oral tablet therapy pack. Patient is having a lot of pain, aching all over in knees and hips. Patient has rheumatoid arthritis. Patient insists Dr. Kathryn Tineo is not the ordering provider, last time  placed order. Per chart review,  ordered the medication. Patient states she has appointment coming up with Joselyn Foy in November. She does not want to contact rheumatology.            Future Appointments   Date Time Provider [place]   11/13/2023 12:00 PM Raegan Vaughn MD [facility]   2/2/2024  1:20 PM Scotty Cobb MD [facility]

## 2023-10-18 NOTE — TELEPHONE ENCOUNTER
Relayed Dr message, pt stated -she have always asked the Dr , advised he's stating the net time,verbalized understanding

## 2023-10-18 NOTE — TELEPHONE ENCOUNTER
I have sent Medrol Dosepak to the pharmacy. However, please inform patient that she should be getting this prescription from rheumatology in the future.

## 2023-11-04 ENCOUNTER — TELEPHONE (OUTPATIENT)
Dept: RHEUMATOLOGY | Facility: CLINIC | Age: 53
End: 2023-11-04

## 2023-11-04 NOTE — TELEPHONE ENCOUNTER
Patient called and is asking if her appointment on 11/13 needs to be rescheduled or if she is okay to go that day. There is a reschedule symbol but no notes were left to reschedule? Will  be in office that day? Please confirm with patient.

## 2023-11-09 NOTE — TELEPHONE ENCOUNTER
Patient is calling to ask if this appointment can be rescheduled as a telehealth visit as she is having transportation problem   Please advse    11/13/203  at Noon    Please call back at this number please         [phone]

## 2023-11-09 NOTE — TELEPHONE ENCOUNTER
Please see below and advise. Pt contacted and advised her appointment is scheduled as a consult and she needs to be seen in office. Pt verbalized understanding and scheduled on 2/5 and placed on wait list. Pt is requesting to be seen sooner per her PCP. She is asking if there are medications she can be placed on until appointment. She declined appointment with Dr. Hector.       Was seen in office last 11/08/2021 but did not return, was also see by Dr. Hazel.     ASSESSMENT AND PLAN:      Severe destructive rheumatoid arthritis  - She has ongoing joint pain and swelling.  Diagnosed with RA in 1996.  She was previously on Humira, methotrexate and prednisone 5 mg daily up until 2017.  She does not follow with rheumatology since 2017 and has been treating her RA with Tylenol, tramadol and naproxen as needed  - On examination she does have acute findings of RA with diffuse pain and swelling  - She does not want to go back on an injection.  - Plan to start patient on prednisone, she will take 40 mg daily x3 days then 30 mg daily x3 days then 20 mg daily x3 days then 10 mg daily x3 days and then stay on 5 mg daily plan to also start leflunomide 20 mg daily.  Risk/benefits discussed with patient  - Plan to also start Xeljanz, p.o. be needed.  Wrist suspension assist with patient.  No history of blood clots.  Can cause anemia or reactivation of shingles.  Will need to monitor blood work every 3 months  - Blood work today and in February     Chronic steroids  - She was on chronic steroids in the past.  Bone density in 2014 was normal.  Will likely need to repeat bone density in the future  - Advised to take calcium and vitamin D supplements as she will likely be on chronic prednisone again     Thank you for allowing me to participate in this patients care. Pt will f/u in 2-3 mos      Bekah Hernandez MD  11/8/2021  11:07 AM

## 2023-11-16 NOTE — TELEPHONE ENCOUNTER
Not able to prescribe until seen since she is a new pt to me   She got a medrol meserte from dr. Cisneros in 10/2023

## 2023-11-20 ENCOUNTER — TELEPHONE (OUTPATIENT)
Dept: INTERNAL MEDICINE CLINIC | Facility: CLINIC | Age: 53
End: 2023-11-20

## 2023-11-20 NOTE — TELEPHONE ENCOUNTER
Patient calling (identified name and ) regarding her Meloxicam. Asking if she can take 7.5 mg in the morning and 7.5 mg in the evening instead of 15 mg daily. States when she takes it once a day, the effect is not lasting the entire day. Requesting 7.5 mg tabs to be sent to pharmacy if Dr. Bradley Garcia is agreeable with splitting the dose. Please advise.

## 2023-12-20 NOTE — TELEPHONE ENCOUNTER
Discharge Planning Assessment  [facility]     Patient Name: Joni Hoffman  MRN: [id number]  Today's Date: 12/20/2023    Admit Date: 12/12/2023    Plan: HOME   Discharge Needs Assessment    No documentation.                  Discharge Plan       Row Name 12/20/23 1100       Plan    Plan HOME    Patient/Family in Agreement with Plan yes    Plan Comments Met with pt at bedside to f/u DCP.  Plan is home today.  He is tolerating room air.  No immediate needs identified/voiced.    Final Discharge Disposition Code 01 - home or self-care                  Continued Care and Services - Admitted Since 12/12/2023    Coordination has not been started for this encounter.       Expected Discharge Date and Time       Expected Discharge Date Expected Discharge Time    Dec 20, 2023            Demographic Summary    No documentation.                  Functional Status    No documentation.                  Psychosocial    No documentation.                  Abuse/Neglect    No documentation.                  Legal    No documentation.                  Substance Abuse    No documentation.                  Patient Forms    No documentation.                     Sharon Farley RN     Verified name and . Patient calling to follow up on referral status. She was advised that referral is still with status of \"open. \" She states she will call back to follow up on status.

## 2024-01-18 RX ORDER — MELOXICAM 7.5 MG/1
7.5 TABLET ORAL 2 TIMES DAILY
Qty: 60 TABLET | Refills: 0 | Status: SHIPPED | OUTPATIENT
Start: 2024-01-18

## 2024-01-18 NOTE — TELEPHONE ENCOUNTER
Refill passed per [facility] protocol.  Lavonne Tabares22 hours ago (4:24 PM)       Can Dr LEVI Cisneros prescribe Meloxicam for patient in 7.5mg twice a day. Send to verified pharmacy:  [facility] in [place].        *He prescribed 15mg in the past, however pt cut in half and took twice a day because it was more effective she says.     She has 5 pills left          Requested Prescriptions   Pending Prescriptions Disp Refills    Meloxicam 7.5 MG Oral Tab 60 tablet 0     Sig: Take 1 tablet (7.5 mg total) by mouth in the morning and 1 tablet (7.5 mg total) before bedtime.       Non-Narcotic Pain Medication Protocol Passed - 1/18/2024  2:12 PM        Passed - In person appointment or virtual visit in the past 6 mos or appointment in next 3 mos     Recent Outpatient Visits              3 months ago Rheumatoid arthritis involving multiple sites with positive rheumatoid factor ([facility])    [facility]    Office Visit    6 months ago [facility] deficiency anemia, unspecified [facility] deficiency anemia [facility]    1 year ago Abnormal mammogram    [facility]    Telemedicine    1 year ago Rheumatoid arthritis involving multiple sites with positive rheumatoid factor ([facility])    [facility]    Telemedicine    2 years ago Rheumatoid arthritis involving multiple sites with positive rheumatoid factor ([facility])    [facility] Bekah Hernandez MD    Office Visit          Future Appointments         Provider Department Appt Notes    In 2 weeks Rhonda Abarca MD [facility]     In 3 months Marlene Fernandez MD [facility] - OB/GYN Annual                   Recent Outpatient Visits              3 months ago Rheumatoid arthritis involving multiple sites with positive rheumatoid factor (HCC)    [facility] Jerry Ha MD    Office Visit    6 months ago Iron deficiency anemia, unspecified iron deficiency anemia type    [facility], Jerry Ha MD    Telemedicine    1 year ago Abnormal mammogram    [facility], Jerry Ha MD    Telemedicine    1 year ago Rheumatoid arthritis involving multiple sites with positive rheumatoid factor ([facility]    2 years ago Rheumatoid arthritis involving multiple sites with positive rheumatoid factor ([facility])    [facility] Bekah Hernandez MD    Office Visit          Future Appointments         Provider Department Appt Notes    In 2 weeks Rhonda Abarca MD [facility]     In 3 months Marlene Fernandez MD [facility] - OB/GYN Annual

## 2024-01-18 NOTE — TELEPHONE ENCOUNTER
Rx pended sign if appropriate      Can Dr LEVI Cisneros prescribe Meloxicam for patient in 7.5mg twice a day. Send to verified pharmacy:  [facility] in [place].        *He prescribed 15mg in the past, however pt cut in half and took twice a day because it was more effective she says.

## 2024-04-22 ENCOUNTER — LAB ENCOUNTER (OUTPATIENT)
Dept: LAB | Facility: HOSPITAL | Age: 54
End: 2024-04-22
Attending: INTERNAL MEDICINE
Payer: COMMERCIAL

## 2024-04-22 ENCOUNTER — OFFICE VISIT (OUTPATIENT)
Dept: RHEUMATOLOGY | Facility: CLINIC | Age: 54
End: 2024-04-22

## 2024-04-22 VITALS
HEIGHT: 64 IN | DIASTOLIC BLOOD PRESSURE: 117 MMHG | HEART RATE: 111 BPM | BODY MASS INDEX: 21 KG/M2 | SYSTOLIC BLOOD PRESSURE: 186 MMHG

## 2024-04-22 DIAGNOSIS — D47.2 MGUS (MONOCLONAL GAMMOPATHY OF UNKNOWN SIGNIFICANCE): ICD-10-CM

## 2024-04-22 DIAGNOSIS — Z00.00 ADULT GENERAL MEDICAL EXAM: ICD-10-CM

## 2024-04-22 DIAGNOSIS — E55.9 VITAMIN D DEFICIENCY: ICD-10-CM

## 2024-04-22 DIAGNOSIS — M17.0 BILATERAL PRIMARY OSTEOARTHRITIS OF KNEE: ICD-10-CM

## 2024-04-22 DIAGNOSIS — M05.79 RHEUMATOID ARTHRITIS INVOLVING MULTIPLE SITES WITH POSITIVE RHEUMATOID FACTOR (HCC): ICD-10-CM

## 2024-04-22 DIAGNOSIS — M05.79 RHEUMATOID ARTHRITIS INVOLVING MULTIPLE SITES WITH POSITIVE RHEUMATOID FACTOR (HCC): Primary | ICD-10-CM

## 2024-04-22 DIAGNOSIS — M81.0 AGE-RELATED OSTEOPOROSIS WITHOUT CURRENT PATHOLOGICAL FRACTURE: ICD-10-CM

## 2024-04-22 LAB
ALBUMIN SERPL-MCNC: 4 G/DL (ref 3.2–4.8)
ALBUMIN/GLOB SERPL: 0.8 {RATIO} (ref 1–2)
ALP LIVER SERPL-CCNC: 78 U/L
ALT SERPL-CCNC: <7 U/L
ANION GAP SERPL CALC-SCNC: 6 MMOL/L (ref 0–18)
AST SERPL-CCNC: 16 U/L (ref ?–34)
BILIRUB SERPL-MCNC: 0.2 MG/DL (ref 0.3–1.2)
BUN BLD-MCNC: 12 MG/DL (ref 9–23)
BUN/CREAT SERPL: 21.8 (ref 10–20)
CALCIUM BLD-MCNC: 10.1 MG/DL (ref 8.7–10.4)
CHLORIDE SERPL-SCNC: 104 MMOL/L (ref 98–112)
CHOLEST SERPL-MCNC: 152 MG/DL (ref ?–200)
CO2 SERPL-SCNC: 28 MMOL/L (ref 21–32)
CREAT BLD-MCNC: 0.55 MG/DL
CRP SERPL-MCNC: 7.1 MG/DL (ref ?–1)
DEPRECATED RDW RBC AUTO: 53 FL (ref 35.1–46.3)
EGFRCR SERPLBLD CKD-EPI 2021: 109 ML/MIN/1.73M2 (ref 60–?)
ERYTHROCYTE [DISTWIDTH] IN BLOOD BY AUTOMATED COUNT: 19.2 % (ref 11–15)
ERYTHROCYTE [SEDIMENTATION RATE] IN BLOOD: 130 MM/HR
EST. AVERAGE GLUCOSE BLD GHB EST-MCNC: 105 MG/DL (ref 68–126)
FASTING PATIENT LIPID ANSWER: NO
FASTING STATUS PATIENT QL REPORTED: NO
GLOBULIN PLAS-MCNC: 4.8 G/DL (ref 2.8–4.4)
GLUCOSE BLD-MCNC: 99 MG/DL (ref 70–99)
HBA1C MFR BLD: 5.3 % (ref ?–5.7)
HCT VFR BLD AUTO: 31.3 %
HDLC SERPL-MCNC: 43 MG/DL (ref 40–59)
HGB BLD-MCNC: 9.2 G/DL
LDLC SERPL CALC-MCNC: 94 MG/DL (ref ?–100)
MCH RBC QN AUTO: 23 PG (ref 26–34)
MCHC RBC AUTO-ENTMCNC: 29.4 G/DL (ref 31–37)
MCV RBC AUTO: 78.3 FL
NONHDLC SERPL-MCNC: 109 MG/DL (ref ?–130)
OSMOLALITY SERPL CALC.SUM OF ELEC: 286 MOSM/KG (ref 275–295)
PLATELET # BLD AUTO: 882 10(3)UL (ref 150–450)
POTASSIUM SERPL-SCNC: 3.9 MMOL/L (ref 3.5–5.1)
PROT SERPL-MCNC: 8.8 G/DL (ref 5.7–8.2)
RBC # BLD AUTO: 4 X10(6)UL
SODIUM SERPL-SCNC: 138 MMOL/L (ref 136–145)
TRIGL SERPL-MCNC: 79 MG/DL (ref 30–149)
TSI SER-ACNC: 0.69 MIU/ML (ref 0.55–4.78)
VIT D+METAB SERPL-MCNC: 56.6 NG/ML (ref 30–100)
VLDLC SERPL CALC-MCNC: 13 MG/DL (ref 0–30)
WBC # BLD AUTO: 6.2 X10(3) UL (ref 4–11)

## 2024-04-22 PROCEDURE — 99215 OFFICE O/P EST HI 40 MIN: CPT | Performed by: INTERNAL MEDICINE

## 2024-04-22 PROCEDURE — 83036 HEMOGLOBIN GLYCOSYLATED A1C: CPT

## 2024-04-22 PROCEDURE — 20610 DRAIN/INJ JOINT/BURSA W/O US: CPT | Performed by: INTERNAL MEDICINE

## 2024-04-22 PROCEDURE — 86140 C-REACTIVE PROTEIN: CPT

## 2024-04-22 PROCEDURE — 36415 COLL VENOUS BLD VENIPUNCTURE: CPT

## 2024-04-22 PROCEDURE — 3080F DIAST BP >= 90 MM HG: CPT | Performed by: INTERNAL MEDICINE

## 2024-04-22 PROCEDURE — 3008F BODY MASS INDEX DOCD: CPT | Performed by: INTERNAL MEDICINE

## 2024-04-22 PROCEDURE — 84443 ASSAY THYROID STIM HORMONE: CPT

## 2024-04-22 PROCEDURE — 3077F SYST BP >= 140 MM HG: CPT | Performed by: INTERNAL MEDICINE

## 2024-04-22 PROCEDURE — 85652 RBC SED RATE AUTOMATED: CPT

## 2024-04-22 PROCEDURE — 86480 TB TEST CELL IMMUN MEASURE: CPT

## 2024-04-22 PROCEDURE — 82306 VITAMIN D 25 HYDROXY: CPT

## 2024-04-22 PROCEDURE — 80053 COMPREHEN METABOLIC PANEL: CPT

## 2024-04-22 PROCEDURE — 80061 LIPID PANEL: CPT

## 2024-04-22 PROCEDURE — 85027 COMPLETE CBC AUTOMATED: CPT

## 2024-04-22 RX ORDER — TRAMADOL HYDROCHLORIDE 50 MG/1
50 TABLET ORAL EVERY 12 HOURS PRN
Qty: 60 TABLET | Refills: 1 | Status: SHIPPED | OUTPATIENT
Start: 2024-04-22

## 2024-04-22 RX ORDER — METHOTREXATE 2.5 MG/1
15 TABLET ORAL WEEKLY
Qty: 78 TABLET | Refills: 1 | Status: SHIPPED | OUTPATIENT
Start: 2024-04-22 | End: 2024-07-21

## 2024-04-22 RX ORDER — TRIAMCINOLONE ACETONIDE 40 MG/ML
40 INJECTION, SUSPENSION INTRA-ARTICULAR; INTRAMUSCULAR ONCE
Status: COMPLETED | OUTPATIENT
Start: 2024-04-22 | End: 2024-04-22

## 2024-04-22 RX ORDER — IBUPROFEN 800 MG/1
800 TABLET ORAL EVERY 6 HOURS PRN
COMMUNITY
End: 2024-04-23

## 2024-04-22 RX ORDER — FOLIC ACID 1 MG/1
1 TABLET ORAL DAILY
Qty: 90 TABLET | Refills: 3 | Status: SHIPPED | OUTPATIENT
Start: 2024-04-22 | End: 2025-04-22

## 2024-04-22 RX ORDER — ACETAMINOPHEN 650 MG/1
650 SUPPOSITORY RECTAL EVERY 4 HOURS PRN
COMMUNITY

## 2024-04-22 RX ORDER — METHYLPREDNISOLONE 4 MG/1
TABLET ORAL
Qty: 1 EACH | Refills: 0 | Status: SHIPPED | OUTPATIENT
Start: 2024-04-22

## 2024-04-22 NOTE — PATIENT INSTRUCTIONS
Check labs today   Cont. Methtotrexate 6 tablets a week and folic acid 1mg ad ay   Tramadol 50mg every 12 hours as needed  #60   Rest both knees x 3 days each    Check bone density test - call [phone] -   Plan for rinvoq 15mg ad ay - info given   Video visits in 3 months

## 2024-04-22 NOTE — PROCEDURES
With paitent's consent, I injected pt's both knees eachwith 1ml lidocaine 1 % and 1 ml kenalog 40. It was done under sterile technique using iodine and alcohol swabs and ethyl chloride was used as an anaesthetic spray. Pt.  tolerated it well.

## 2024-04-22 NOTE — PROGRESS NOTES
Gita Morales is a 54 year old female who presents for   Chief Complaint   Patient presents with    Rheumatoid Arthritis    Knee Pain    Hip Pain    Shoulder Pain   .   HPI:     I had the pleasure of seeing Gita Morales on 4/22/2024 for evaluation.     This is a 52 yo F with hx of MGUS, Fe Deficiency Anemia, RA presents to [facility] care.  She was diagnosed with rheumatoid arthritis more than 20 years ago around 1996.  She was following with Dr. Hazel from 2013  up until 2017.  She states about 13 years ago she was mobile, able to do things for herself, walk and cook.  Over the past 10 years her mobility has been limited.  Now uses a motorized wheelchair to get around.    Started seeing dr. Hazel in 10/2013 - transferred her care from [facility]. She developed rheumatoid arthritis at age 25.  She recalls her rheumatoid factor being positive. She recalls being started on sulfasalazine, which did not help. She was on methotrexate for several years, which she recalls was discontinued because she got pregnant. She has been on Humira probably for 6 years, which has helped. She has been on prednisone on and off. She will often get vaginal yeast infections while on it. She is presently on 2.5 mg of prednisone a day. Naproxen 500 mg twice a day, which helps. Humira every week. Celebrex helped it every day. Ibuprofen caused occasional dizziness when she stood up.     She stopped her meds in 2017 and had not seen rheumatology since then til seeing dr. Hernandez in 2021. .  She currently is controlling her pain with tramadol, Tylenol and naproxen as needed.  She has pain and swelling in a lot of her joints.  She has limited range of motion in her shoulders.  Not able to extend her knees.  She reports swelling in her hands, wrists, elbows and knees.  It is very difficult her for to stand up.  She also reports chronic neck pain with limited range of motion.  In the past she would get cortisone injections in the  knees.she's in a motorized wheel chair       She is here on 4/22/2024  She last saw , rheumatology  in 2021.   She took ibuprofne 800mg otc. She also taking old tramadol. It's all she's been taking.     It's hard for her to get here. Her hsuband takes her to all her visit - works 6 days a week. So its' difficult for her to come in.   She was on meloxicam - but makes her stomach hurt but she stopped taking it. -    Dr. Cisneros has been caring for her.   She tried humira, methotrexate, leflunomide and   She has been using an electric motorized wheelchair - she hasn't been able to get up since 2012. S  She tried xeljanz with dr. Hernandez for 6-12 months and it dind't work well for her.   She also started leflunomide 20mg ad ay with dr. Hernandez but she doesn't remember how it helped her.     She can't remember a signicant change.   She used to take prednisone but now off of it.   She has 910 pain with pain everywhere.   Her arms and body is stiff.   She can't raise her arms, she has chronic changes in her wrists, and hands.   She can't move her hips, knees and feet well - as well as ankles.   She doesn't walk.   She sits in a chair most of the time.     She was getting around on medication but not able to feel great.   She feels better- a little better today - kristy after taking steroids.     This weekend hse saw the eye doctor -         Dr. Hazel i     She saw Dr. Sullivan hematology - in March 2017.  Bone survey was negative.  Haptoglobin high.  She has an IgG lambda monoclonal, and has monoclonal gammopathy of unknown significance.      Wt Readings from Last 2 Encounters:   10/05/23 125 lb (56.7 kg)   09/30/21 125 lb (56.7 kg)     Body mass index is 21.46 kg/m².      Current Outpatient Medications   Medication Sig Dispense Refill    ibuprofen 800 MG Oral Tab Take 1 tablet (800 mg total) by mouth every 6 (six) hours as needed for Pain.      acetaminophen 650 MG Rectal Suppos Place 1 suppository (650 mg total) rectally  every 4 (four) hours as needed for Fever.      TraMADol HCl 50 MG Oral Tab Take 1 tablet (50 mg total) by mouth every 6 (six) hours as needed for Pain. 100 tablet 1    Meloxicam 7.5 MG Oral Tab Take 1 tablet (7.5 mg total) by mouth in the morning and 1 tablet (7.5 mg total) before bedtime. (Patient not taking: Reported on 2024) 60 tablet 0    methylPREDNISolone (MEDROL) 4 MG Oral Tablet Therapy Pack As directed. 21 tablet 0    Meloxicam 15 MG Oral Tab Take 1 tablet (15 mg total) by mouth daily. With meals (Patient not taking: Reported on 2024) 90 tablet 0    acetaminophen (TYLENOL EXTRA STRENGTH) 500 MG Oral Tab Take 1 tablet (500 mg total) by mouth every 8 (eight) hours as needed for Pain. (Patient not taking: Reported on 2024) 60 tablet 2    Tofacitinib Citrate ER (XELJANZ XR) 11 MG Oral Tablet 24 Hr Take 11 mg by mouth daily. (Patient not taking: Reported on 10/5/2023) 30 tablet 1    predniSONE 5 MG Oral Tab Take 1 tablet (5 mg total) by mouth daily. (Patient not taking: Reported on 10/5/2023) 90 tablet 0    leflunomide 20 MG Oral Tab Take 1 tablet (20 mg total) by mouth daily. (Patient not taking: Reported on 10/5/2023) 90 tablet 0      Past Medical History:    Lipid screening    per NG    MGUS (monoclonal gammopathy of unknown significance)    Osteoporosis screening    per NG    Rheumatoid arthritis (HCC)      Past Surgical History:   Procedure Laterality Date          Other surgical history  2008    fibroid surgery      Family History   Problem Relation Age of Onset    Prostate Cancer Father     Diabetes Father     Hypertension Mother     Musculo-skelatal Disorder Mother         spinal stenosis    Arthritis Maternal Grandmother         rheumatoid    Arthritis Paternal Grandfather         rheumatoid    Arthritis Sister         rheumatoid    Bleeding Disorders Paternal Cousin Female         sickle cell anemia    Clotting Disorder Neg       Social History:  Social History     Socioeconomic  History    Marital status:    Tobacco Use    Smoking status: Never    Smokeless tobacco: Never   Vaping Use    Vaping status: Never Used   Substance and Sexual Activity    Alcohol use: No     Alcohol/week: 0.0 standard drinks of alcohol    Drug use: No   Other Topics Concern    Caffeine Concern Yes     Comment: coffee/tea/soda - 16 oz.    Social History Narrative    Patient is ; for 27 yrs, spouse Jovan. She, , and son live in [place], [place]. Gita is unable to work due to her rheumatoid arthritis.            REVIEW OF SYSTEMS:   Review Of Systems:  Fatigue  Constitutional:No fever, no change in weight or appetitie  Derm: No rashes, no oral ulcers, no alopecia, no photosensitivity, no psoriasis  HEENT: No dry eyes, no dry mouth, no Raynaud's, no nasal ulcers, no parotid swelling, no neck pain, no jaw pain, no temple pain  Eyes: No visual changes,   CVS: No chest pain, no heart disease  RS: No SOB, no Cough, No Pleurtic pain,   GI: No nausea, no vomiiting, no abominal pain, no hx of ulcer, no gastritis, no heartburn, no dyshpagia, no BRBPR or melena  : no dysuria, no hx of miscarriages, no DVT Hx, no hx of OCP,   Neuro: No numbness or tingling, no headache, no hx of seizures,   Psych: no hx of anxiety or depression  ENDO: no hx of thyroid disease, no hx of DM  Joint/Muscluskeltal: see HPI,   All other ROS are negative.     EXAM:   BP (!) 180/111 (BP Location: Right arm, Patient Position: Sitting, Cuff Size: adult)   Pulse 109   Ht 5' 4\" (1.626 m)   LMP 09/25/2019 (Exact Date)   BMI 21.46 kg/m²   HEENT: Clear oropharynx, no oral ulcers, EOM intact, clear sclear, PERRLA, pleasant, no acute distress, no CAD,   No rashes  CVS: RRR, no murmurs  RS: CTAB, no crackles, no rhonchi  ABD: Soft Non tender, no HSM felt, BS positive  Joint exam:   no neck tendnerness, good ROM,   Limited motion of her shoulders  Chronic changes of her hands   Limited rom of her hips  Can't fully extend both knees -  flexion contractures about 30 degrees     EXTREMITIES: no cyanosis, clubbing or edema  NEURO: intact touch, 5/5 ue and le strength    Component      Latest Ref Rng 10/7/2013   Anti-Sjogren's A      Negative  Negative    Anti-Sjogren's B      Negative  Negative    RHEUMATOID FACTOR      <14 IU/mL > 600    AMY SCREEN      Negative  Strong Positive !    C-Citrullinated Peptide IgG AB      0.0 - 6.9 U/ML > 340.0 (H)       Component      Latest Ref Rng 10/7/2013   Anti-Sjogren's A      Negative  Negative    Anti-Sjogren's B      Negative  Negative    RHEUMATOID FACTOR      <14 IU/mL > 600    AMY SCREEN      Negative  Strong Positive !    C-Citrullinated Peptide IgG AB      0.0 - 6.9 U/ML > 340.0 (H)       Component      Latest Ref Rng 11/8/2021   HBSAg Screen      Nonreactive   Nonreactive    Hbsag Screen Index 0.452011    HEPATITIS B SURFACE AB QUAL      Nonreactive   Nonreactive    HEPATITIS B SURFACE AB QUANT      mIU/mL <3.10    HEPATITIS B CORE AB, TOTAL      Nonreactive   Nonreactive        Component      Latest Ref Rng 6/19/2023   WBC      4.0 - 11.0 x10(3) uL 7.5    RBC      3.80 - 5.30 x10(6)uL 3.70 (L)    Hemoglobin      12.0 - 16.0 g/dL 7.7 (L)    Hematocrit      35.0 - 48.0 % 27.9 (L)    MCV      80.0 - 100.0 fL 75.4 (L)    MCH      26.0 - 34.0 pg 20.8 (L)    MCHC      31.0 - 37.0 g/dL 27.6 (L)    RDW-SD      35.1 - 46.3 fL 53.5 (H)    RDW      11.0 - 15.0 % 20.5 (H)    Platelet Count      150.0 - 450.0 10(3)uL 782.0 (H)    Prelim Neutrophil Abs      1.50 - 7.70 x10 (3) uL 4.81    Neutrophils Absolute      1.50 - 7.70 x10(3) uL 4.81    Lymphocytes Absolute      1.00 - 4.00 x10(3) uL 1.93    Monocytes Absolute      0.10 - 1.00 x10(3) uL 0.50    Eosinophils Absolute      0.00 - 0.70 x10(3) uL 0.18    Basophils Absolute      0.00 - 0.20 x10(3) uL 0.05    Immature Granulocyte Absolute      0.00 - 1.00 x10(3) uL 0.02    Neutrophils %      % 64.1    Lymphocytes %      % 25.8    Monocytes %      % 6.7    Eosinophils  %      % 2.4    Basophils %      % 0.7    Immature Granulocyte %      % 0.3    Glucose      70 - 99 mg/dL 124 (H)    Sodium      136 - 145 mmol/L 140    Potassium      3.5 - 5.1 mmol/L 3.4 (L)    Chloride      98 - 112 mmol/L 107    Carbon Dioxide, Total      21.0 - 32.0 mmol/L 28.0    ANION GAP      0 - 18 mmol/L 5    BUN      7 - 18 mg/dL 19 (H)    CREATININE      0.55 - 1.02 mg/dL 0.44 (L)    BUN/CREATININE RATIO      10.0 - 20.0  43.2 (H)    CALCIUM      8.5 - 10.1 mg/dL 9.4    CALCULATED OSMOLALITY      275 - 295 mOsm/kg 294    EGFR      >=60 mL/min/1.73m2 116    ALT (SGPT)      13 - 56 U/L 18    AST (SGOT)      15 - 37 U/L 14 (L)    ALKALINE PHOSPHATASE      41 - 108 U/L 81    Total Bilirubin      0.1 - 2.0 mg/dL 0.2    PROTEIN, TOTAL      6.4 - 8.2 g/dL 8.7 (H)    Albumin      3.4 - 5.0 g/dL 2.5 (L)    Globulin      2.8 - 4.4 g/dL 6.2 (H)    A/G Ratio      1.0 - 2.0  0.4 (L)    Patient Fasting for CMP? No    Cholesterol, Total      <200 mg/dL 147    HDL Cholesterol      40 - 59 mg/dL 40    Triglycerides      30 - 149 mg/dL 88    LDL Cholesterol Calc      <100 mg/dL 90    VLDL      0 - 30 mg/dL 14    NON-HDL CHOLESTEROL      <130 mg/dL 107    Patient Fasting for Lipid? No    HEMOGLOBIN A1c      4.8 - 5.6 % --    HEMOGLOBIN A1c       5.5    ESTIMATED AVERAGE GLUCOSE --    C-REACTIVE PROTEIN      <0.30 mg/dL 9.23 (H)    SED RATE      0 - 30 mm/Hr 130 (H)    OCCULT BLOOD RESULT      Negative  Negative    TSH      0.358 - 3.740 mIU/mL 0.488           10/7/13  6:15 PM    MAY Titer/Pattern 640   Comment: 640 HOMOGENEOUS AMY     3/8/2027 - bone survery   1. No evidence of osteolytic or osteoblastic lesions.   2. Moderate to advanced arthritis in hips and knees.   3. 3 calcified uterine fibroids with largest measuring 9.1 cm.   4. Moderate degenerative disc disease at C5-6.   5. Thoracolumbar dextroscoliosis.   6. Minimal chronic anterior wedging of T7 vertebral body.     ASSESSMENT AND PLAN:   Gita Morales is a 54  year old female who presents for   Chief Complaint   Patient presents with    Rheumatoid Arthritis    Knee Pain    Hip Pain    Shoulder Pain       1. Rheumatoid arthritis involving multiple sites with positive rheumatoid factor (HCC)  Severe destructive rheumatoid arthritis  - She has ongoing joint pain and swelling.  Diagnosed with RA in 1996.  She was previously on Humira, methotrexate and prednisone 5 mg daily up until 2017.  Then tried leflunomide 20mg a day and xeljanz 11mg ad ay   She does not follow with rheumatology since 2021and has been treating her RA with Tylenol, tramadol and naproxen as needed  - On examination she does have crhonic  findings of RA with diffuse pain and swelling  After long d/w her she would like to start back on methotrexate 15mg a week and fa 1mg a day   Discussed with patient risks and benefits, including hepatoxicity risk, teratogenicity -  not to get pregnant on the medication and infections risk.  Will try rinvoq 15mg ad ay -   Risk of Cami inhibitors discussed with patient including but not limited to increased risk of thrombosis, blood count abnormalities, increased risk of infection including shingles, weight gain, skin reactions, GI symptoms, hepatotoxicity, and possible risk of lymphoma.    - taking ibuprofen 800mg -   - tramadol 50mg 1-2 tablets a day as needed        Chronic steroids in the past.   - She was on chronic steroids in the past.  Bone density in 2014 was normal.  Will likely need to repeat bone density now   On bone survery in 2017 - T7 comproession fracture noted.   - Advised to take calcium and vitamin D supplements as she will likely be on chronic prednisone again  - hold on chronci prednisone.     Biltateral osteoarthritis of both knees   With paitent's consent, I injected pt's both knees eachwith 1ml lidocaine 1 % and 1 ml kenalog 40. It was done under sterile technique using iodine and alcohol swabs and ethyl chloride was used as an anaesthetic spray. Pt.   tolerated it well.      Summary:  Check labs today   2. Cont. Methtotrexate 6 tablets a week and folic acid 1mg ad ay   3. Tramadol 50mg every 12 hours as needed  #60  4. Rest both knees x 3 days each   5. Check bone density test - call [phone] -   6. Plan for rinvoq 15mg ad ay - info given   7. Video visits in 3 months    Rhonda Abarca MD  4/22/2024  1:56 PM

## 2024-04-23 ENCOUNTER — TELEPHONE (OUTPATIENT)
Dept: INTERNAL MEDICINE CLINIC | Facility: CLINIC | Age: 54
End: 2024-04-23

## 2024-04-23 DIAGNOSIS — M05.79 RHEUMATOID ARTHRITIS INVOLVING MULTIPLE SITES WITH POSITIVE RHEUMATOID FACTOR (HCC): Primary | ICD-10-CM

## 2024-04-23 NOTE — TELEPHONE ENCOUNTER
Patient called to asked Dr Jerry Cisneros to write orders for in-home physical and occupational therapy as dicussed at her last office visit.  Patient had her appointment w/ Dr Abarca and she agrees and said for Dr LEVI Cisneros to orders.

## 2024-04-23 NOTE — TELEPHONE ENCOUNTER
LOV: 4/22/2024  Future Appointments   Date Time Provider Department Center   5/7/2024  2:20 PM Marlene Fernandez MD [facility]   7/22/2024  4:50 PM Rhonda Abarca MD [facility]     Labs:    Component      Latest Ref Rng 4/22/2024   Glucose      70 - 99 mg/dL 99    Sodium      136 - 145 mmol/L 138    Potassium      3.5 - 5.1 mmol/L 3.9    Chloride      98 - 112 mmol/L 104    Carbon Dioxide, Total      21.0 - 32.0 mmol/L 28.0    ANION GAP      0 - 18 mmol/L 6    BUN      9 - 23 mg/dL 12    CREATININE      0.55 - 1.02 mg/dL 0.55    BUN/CREATININE RATIO      10.0 - 20.0  21.8 (H)    CALCIUM      8.7 - 10.4 mg/dL 10.1    CALCULATED OSMOLALITY      275 - 295 mOsm/kg 286    EGFR      >=60 mL/min/1.73m2 109    ALT (SGPT)      10 - 49 U/L <7 (L)    AST (SGOT)      <=34 U/L 16    ALKALINE PHOSPHATASE      41 - 108 U/L 78    Total Bilirubin      0.3 - 1.2 mg/dL 0.2 (L)    PROTEIN, TOTAL      5.7 - 8.2 g/dL 8.8 (H)    Albumin      3.2 - 4.8 g/dL 4.0    Globulin      2.8 - 4.4 g/dL 4.8 (H)    A/G Ratio      1.0 - 2.0  0.8 (L)    Patient Fasting for CMP? No       Legend:  (H) High  (L) Low

## 2024-04-23 NOTE — TELEPHONE ENCOUNTER
Patient asking Dr Abarca to refill Rx:    800 mg Ibuprofen  3-month supply    Patient apologizes she forgot to ask at her recent appointment.  This is urgent because patient is going to pharmacy today    [id number] - [place], [place] - [address] [facility] [address], [phone], [phone] [address]

## 2024-04-24 LAB
M TB IFN-G CD4+ T-CELLS BLD-ACNC: 0.01 IU/ML
M TB TUBERC IGNF/MITOGEN IGNF CONTROL: 0.37 IU/ML
QFT TB1 AG MINUS NIL: 0 IU/ML
QFT TB2 AG MINUS NIL: 0 IU/ML

## 2024-04-24 RX ORDER — IBUPROFEN 800 MG/1
800 TABLET ORAL EVERY 6 HOURS PRN
Qty: 360 TABLET | Refills: 1 | Status: SHIPPED | OUTPATIENT
Start: 2024-04-24

## 2024-04-25 ENCOUNTER — TELEPHONE (OUTPATIENT)
Dept: INTERNAL MEDICINE CLINIC | Facility: CLINIC | Age: 54
End: 2024-04-25

## 2024-04-25 DIAGNOSIS — M05.79 RHEUMATOID ARTHRITIS INVOLVING MULTIPLE SITES WITH POSITIVE RHEUMATOID FACTOR (HCC): Primary | ICD-10-CM

## 2024-04-25 DIAGNOSIS — Z74.09 IMPAIRED MOBILITY AND ENDURANCE: ICD-10-CM

## 2024-04-25 NOTE — TELEPHONE ENCOUNTER
Patient called and said residential home does not service her area for physical therapy, is asking if there is anyone else that can service her area,  Physical Therapy needs to go to   [address] [place] [address]

## 2024-04-25 NOTE — TELEPHONE ENCOUNTER
Received a call from the [facility] , Ling who advised the address on the current home health order is not the correct home address. Per Ling the patient has an address showing [address]. I opened the patient's chart and the address is showing [address]    [place] [id number].    Ling did leave a message to have the patient contact her to clarify the home address.       CSS, can you please contact the patient as well to clarify and update her address in EPIC? Thanks

## 2024-04-25 NOTE — TELEPHONE ENCOUNTER
Am not sure how I can answer this question.  Please let me know if I have to put a different referral.  Thank you

## 2024-04-25 NOTE — TELEPHONE ENCOUNTER
TANESHA: Talked to patient and she states that her address on our chart is right: [address]  , but the Home Health people will go to [place], [place], that is where the patient will have her Home Health visit. Per patient she called the home health but she received the voice mail. She says that she is going to call them again as soon as I talked to her.

## 2024-04-25 NOTE — TELEPHONE ENCOUNTER
Pt was called and inform of  message below. Pt was inform to call her insurance to see what other Home health agency is covered and we will be able to sent  the referral. Pt is currently staying at [address] [place] [address]. Pt will call us back with this information.

## 2024-04-25 NOTE — TELEPHONE ENCOUNTER
Spoke to izaiah at [facility], stated original order can be sent but with residential changed to [facility], Dr Cisneros please change Home Health name on order and will fax when completed

## 2024-04-25 NOTE — TELEPHONE ENCOUNTER
[facility] calling to inform that they have received the referral for home health from [facility]. Please send this company the order directly, and state that the order originally was sent to residential, but they forwarded the order to [facility] due to patient being outside of their coverage area.   Fax# [phone]

## 2024-04-26 NOTE — TELEPHONE ENCOUNTER
Elaine from [facility] is calling to advise Dr. Cisneros they received the fax referral and will be unable to service the patient as this is out of the area of service.    Any questions, call back     Please advise.

## 2024-04-26 NOTE — TELEPHONE ENCOUNTER
Dr Cisneros please sign off on new referral, patient will be receiving home health at different location and home health requested address to be listed in referral

## 2024-04-26 NOTE — TELEPHONE ENCOUNTER
Successful fax confirmation received, spoke to izaiah to confirm, let her know if she had any question she could give us a call back

## 2024-04-29 ENCOUNTER — TELEPHONE (OUTPATIENT)
Dept: RHEUMATOLOGY | Facility: CLINIC | Age: 54
End: 2024-04-29

## 2024-04-29 DIAGNOSIS — R76.12 FALSE POSITIVE QUANTIFERON-TB GOLD TEST: ICD-10-CM

## 2024-04-29 DIAGNOSIS — M05.79 RHEUMATOID ARTHRITIS INVOLVING MULTIPLE SITES WITH POSITIVE RHEUMATOID FACTOR (HCC): Primary | ICD-10-CM

## 2024-04-29 NOTE — TELEPHONE ENCOUNTER
Plan on rinvoq 15mg a day - willl need prior auth -   She has indeterminate quantiferon gold   Please ask her to get a chest xray - will order for her

## 2024-04-30 ENCOUNTER — TELEPHONE (OUTPATIENT)
Dept: INTERNAL MEDICINE CLINIC | Facility: CLINIC | Age: 54
End: 2024-04-30

## 2024-04-30 NOTE — TELEPHONE ENCOUNTER
Phoned pt; verified name and . Informed pt Rinvoq PA in process. Discussed TB results indeterminate and that Dr Abarca has ordered Chest Xray; will need results before starting Rinvoq.

## 2024-04-30 NOTE — TELEPHONE ENCOUNTER
Elaine with [facility] states [facility] is requesting a fax cover sheet stating PCP needs to indicate if it is ok for the order to be changed from [facility] to [facility] due to [facility] not being covered under the patient's insurance.   The fax cover can be faxed to: [phone].  Reference case number: [id number]  Elaine is also requesting a copy of the fax cover sheet be faxed to her at: [phone].

## 2024-04-30 NOTE — TELEPHONE ENCOUNTER
PA start    Prior authorization for: Rinvoq    Medication form: 15mg tab    Submission method: SureScripts    Spoke with (if by phone):     Date submitted: 4/30/24    Tracking #:    QF-TB result: Chest Xray ordered    Component      Latest Ref Rng 4/22/2024   Quantiferon TB NIL      IU/mL 0.01    Quantiferon-TB1 Minus NIL      IU/mL 0.00    Quantiferon-TB2 Minus NIL      IU/mL 0.00    Quantiferon TB Mitogen minus NIL      IU/mL 0.37    Quantiferon TB Result      Negative  Indeterminate

## 2024-04-30 NOTE — TELEPHONE ENCOUNTER
It is okay to change home health from residential to [facility] because of insurance reasons..  Thank you

## 2024-05-01 ENCOUNTER — MED REC SCAN ONLY (OUTPATIENT)
Dept: INTERNAL MEDICINE CLINIC | Facility: CLINIC | Age: 54
End: 2024-05-01

## 2024-05-01 NOTE — TELEPHONE ENCOUNTER
PA Approved - Script pended and routed for review and signature.    Prior authorization for: Rinvoq    Medication form: 15mg tab    Date received: 4/30/24    Approval #: [id number]    Approved dates: 03/31/2024 to 04/30/2025    Pharmacy for medication: [facility]     QF-TB results: Pt informed on 4/30/24 to get Chest Xray per Dr Abarca    Component      Latest Ref Rng 4/22/2024   Quantiferon TB NIL      IU/mL 0.01    Quantiferon-TB1 Minus NIL      IU/mL 0.00    Quantiferon-TB2 Minus NIL      IU/mL 0.00    Quantiferon TB Mitogen minus NIL      IU/mL 0.37    Quantiferon TB Result      Negative  Indeterminate

## 2024-05-01 NOTE — TELEPHONE ENCOUNTER
Elaine from [facility] called stating the fax is being sent to her cell phone number. Per patient please send fax to the following fax#:      Fax#: [phone]    She state she can also come in the office to pick this up if needed.

## 2024-05-02 RX ORDER — UPADACITINIB 15 MG/1
15 TABLET, EXTENDED RELEASE ORAL DAILY
Qty: 30 TABLET | Refills: 5 | Status: SHIPPED | OUTPATIENT
Start: 2024-05-02

## 2024-05-06 NOTE — TELEPHONE ENCOUNTER
Name and  verified. Pt aware Rinvoq approved and sent to [facility] (phone [phone]). Pt advised to go online for a co-pay card. Pt agreeable with plan. She may call office back for pharmacy's phone number.

## 2024-05-20 ENCOUNTER — HOME HEALTH CHARGES (OUTPATIENT)
Dept: INTERNAL MEDICINE CLINIC | Facility: CLINIC | Age: 54
End: 2024-05-20

## 2024-05-20 DIAGNOSIS — M05.79 RHEU ARTHRITIS W RHEU FACTOR MULT SITE W/O ORG/SYS INVOLV (HCC): Primary | ICD-10-CM

## 2024-05-20 DIAGNOSIS — M16.0 PRIMARY OSTEOARTHRITIS OF BOTH HIPS: ICD-10-CM

## 2024-05-20 NOTE — PROGRESS NOTES
This is the initial home health certification. [facility]  SOC:05/08/2024  Dates of service are for 05/08/2024-07/06/2024    See scanned reports for plan of care     Order# [id number]

## 2024-05-20 NOTE — PROGRESS NOTES
This is the initial home health certification. [facility]  SOC:05/08/2024  Dates of service are for 05/08/2024-07/06/2024     See scanned reports for plan of care     Order# [id number]

## 2024-05-21 ENCOUNTER — TELEPHONE (OUTPATIENT)
Dept: INTERNAL MEDICINE CLINIC | Facility: CLINIC | Age: 54
End: 2024-05-21

## 2024-05-22 NOTE — TELEPHONE ENCOUNTER
Angelina  called from [facility] at [phone] regarding BP. 167/112. Prior PT readings 140/100's. Pt. Denies Sx. Newest reading 139/89. Message to PCP.

## 2024-05-23 ENCOUNTER — MED REC SCAN ONLY (OUTPATIENT)
Dept: INTERNAL MEDICINE CLINIC | Facility: CLINIC | Age: 54
End: 2024-05-23

## 2024-06-24 ENCOUNTER — MED REC SCAN ONLY (OUTPATIENT)
Dept: INTERNAL MEDICINE CLINIC | Facility: CLINIC | Age: 54
End: 2024-06-24

## 2024-06-24 ENCOUNTER — HOME HEALTH CHARGES (OUTPATIENT)
Dept: INTERNAL MEDICINE CLINIC | Facility: CLINIC | Age: 54
End: 2024-06-24

## 2024-06-24 DIAGNOSIS — M05.79 RHEU ARTHRITIS W RHEU FACTOR MULT SITE W/O ORG/SYS INVOLV (HCC): Primary | ICD-10-CM

## 2024-06-24 DIAGNOSIS — M17.0 BILATERAL PRIMARY OSTEOARTHRITIS OF KNEE: ICD-10-CM

## 2024-06-24 NOTE — PROGRESS NOTES
This is the initial home health certification for [facility]  SOC: 5/8/2024  Dates of service are for 5/8/2024 to 7/6/2024    See scanned reports for plan of care     Order#

## 2024-06-24 NOTE — PROGRESS NOTES
This is the initial home health certification for [facility]  SOC: 5/8/2024  Dates of service are for 5/8/2024 to 7/6/2024     See scanned reports for plan of care     Order#

## 2024-07-22 ENCOUNTER — TELEPHONE (OUTPATIENT)
Dept: RHEUMATOLOGY | Facility: CLINIC | Age: 54
End: 2024-07-22

## 2024-07-22 NOTE — TELEPHONE ENCOUNTER
Left message for patient - not able to get a hold of her - ok to take her off the schedule   A juan alarm backed up the last part of the day -   Can you call her or have  call her - She can schedule on 7/24 at 12:20 pm video or 4:50 pm video that day

## 2024-07-23 NOTE — TELEPHONE ENCOUNTER
Appointment scheduled.      Future Appointments   Date Time Provider Department Center   7/24/2024 12:20 PM Rhonda Abarca MD [facility]

## 2024-07-24 ENCOUNTER — TELEMEDICINE (OUTPATIENT)
Dept: RHEUMATOLOGY | Facility: CLINIC | Age: 54
End: 2024-07-24

## 2024-07-24 DIAGNOSIS — Z51.81 ENCOUNTER FOR THERAPEUTIC DRUG MONITORING: ICD-10-CM

## 2024-07-24 DIAGNOSIS — M05.79 RHEUMATOID ARTHRITIS INVOLVING MULTIPLE SITES WITH POSITIVE RHEUMATOID FACTOR (HCC): Primary | ICD-10-CM

## 2024-07-24 PROCEDURE — G2211 COMPLEX E/M VISIT ADD ON: HCPCS | Performed by: INTERNAL MEDICINE

## 2024-07-24 PROCEDURE — 99214 OFFICE O/P EST MOD 30 MIN: CPT | Performed by: INTERNAL MEDICINE

## 2024-07-24 RX ORDER — METHOTREXATE 2.5 MG/1
20 TABLET ORAL WEEKLY
Qty: 104 TABLET | Refills: 1 | Status: SHIPPED | OUTPATIENT
Start: 2024-07-24 | End: 2024-10-22

## 2024-07-24 RX ORDER — TRAMADOL HYDROCHLORIDE 50 MG/1
50 TABLET ORAL EVERY 12 HOURS PRN
Qty: 60 TABLET | Refills: 5 | Status: SHIPPED | OUTPATIENT
Start: 2024-07-24

## 2024-07-24 NOTE — PROGRESS NOTES
Gita Morales is a 54 year old female who presents for   No chief complaint on file.    This visit is conducted using Telemedicine with live, interactive video and audio.    Patient has been referred to the [facility] website at [email or web address] to review the yearly Consent to Treat document.    Patient understands and accepts financial responsibility for any deductible, co-insurance and/or co-pays associated with this service.      HPI:     I had the pleasure of seeing Gita Morales on 4/22/2024 for evaluation.     This is a 52 yo F with hx of MGUS, Fe Deficiency Anemia, RA presents to [facility].  She was diagnosed with rheumatoid arthritis more than 20 years ago around 1996.  She was following with Dr. Hazel from 2013  up until 2017.  She states about 13 years ago she was mobile, able to do things for herself, walk and cook.  Over the past 10 years her mobility has been limited.  Now uses a motorized wheelchair to get around.    Started seeing dr. Hazel in 10/2013 - transferred her care from [facility]. She developed rheumatoid arthritis at age 25.  She recalls her rheumatoid factor being positive. She recalls being started on sulfasalazine, which did not help. She was on methotrexate for several years, which she recalls was discontinued because she got pregnant. She has been on Humira probably for 6 years, which has helped. She has been on prednisone on and off. She will often get vaginal yeast infections while on it. She is presently on 2.5 mg of prednisone a day. Naproxen 500 mg twice a day, which helps. Humira every week. Celebrex helped it every day. Ibuprofen caused occasional dizziness when she stood up.     She stopped her meds in 2017 and had not seen rheumatology since then til seeing dr. Hernandez in 2021. .  She currently is controlling her pain with tramadol, Tylenol and naproxen as needed.  She has pain and swelling in a lot of her joints.  She has limited range of motion in her  shoulders.  Not able to extend her knees.  She reports swelling in her hands, wrists, elbows and knees.  It is very difficult her for to stand up.  She also reports chronic neck pain with limited range of motion.  In the past she would get cortisone injections in the knees.she's in a motorized wheel chair       She is here on 4/22/2024  She last saw , rheumatology  in 2021.   She took ibuprofne 800mg otc. She also taking old tramadol. It's all she's been taking.     It's hard for her to get here. Her hsuband takes her to all her visit - works 6 days a week. So its' difficult for her to come in.   She was on meloxicam - but makes her stomach hurt but she stopped taking it. -    Dr. Cisneros has been caring for her.   She tried humira, methotrexate, leflunomide and   She has been using an electric motorized wheelchair - she hasn't been able to get up since 2012. S  She tried xeljanz with dr. Hernandez for 6-12 months and it dind't work well for her.   She also started leflunomide 20mg ad ay with dr. Hernandez but she doesn't remember how it helped her.     She can't remember a signicant change.   She used to take prednisone but now off of it.   She has 910 pain with pain everywhere.   Her arms and body is stiff.   She can't raise her arms, she has chronic changes in her wrists, and hands.   She can't move her hips, knees and feet well - as well as ankles.   She doesn't walk.   She sits in a chair most of the time.     She was getting around on medication but not able to feel great.   She feels better- a little better today - kristy after taking steroids.     This weekend hse saw the eye doctor -         Dr. Hazel i     She saw Dr. Sullivan hematology - in March 2017.  Bone survey was negative.  Haptoglobin high.  She has an IgG lambda monoclonal, and has monoclonal gammopathy of unknown significance.      7/24/2024  VIDEO VISIT  She is on the methotrexate   She hasn't gotten a chest xray and never took the rinvoq -   She  was ok - b/c she wanted to see if the methotrexate was working - she actually likes it and doing a lot better.   She's not even sure she's ready to start the prescription.     She is feeling better.   She has a lot o let pain than when she came in   She feels right now she is struggling with her knee pain.   The steroid shots did help the knees - she feels the pain went down 50% - pain went from 10 to 5/10 pain.   In home physical therapy did well for her knees -       Wt Readings from Last 2 Encounters:   10/05/23 125 lb (56.7 kg)   09/30/21 125 lb (56.7 kg)     There is no height or weight on file to calculate BMI.      Current Outpatient Medications   Medication Sig Dispense Refill    Upadacitinib ER (RINVOQ) 15 MG Oral Tablet 24 Hr Take 15 mg by mouth daily. 30 tablet 5    ibuprofen 800 MG Oral Tab Take 1 tablet (800 mg total) by mouth every 6 (six) hours as needed for Pain. 360 tablet 1    acetaminophen 650 MG Rectal Suppos Place 1 suppository (650 mg total) rectally every 4 (four) hours as needed for Fever.      traMADol 50 MG Oral Tab Take 1 tablet (50 mg total) by mouth every 12 (twelve) hours as needed for Pain. 60 tablet 1    methylPREDNISolone 4 MG Oral Tablet Therapy Pack Take as directed on package. 1 each 0    folic acid 1 MG Oral Tab Take 1 tablet (1 mg total) by mouth daily. 90 tablet 3    Meloxicam 7.5 MG Oral Tab Take 1 tablet (7.5 mg total) by mouth in the morning and 1 tablet (7.5 mg total) before bedtime. (Patient not taking: Reported on 4/22/2024) 60 tablet 0    methylPREDNISolone (MEDROL) 4 MG Oral Tablet Therapy Pack As directed. 21 tablet 0    Meloxicam 15 MG Oral Tab Take 1 tablet (15 mg total) by mouth daily. With meals (Patient not taking: Reported on 4/22/2024) 90 tablet 0    acetaminophen (TYLENOL EXTRA STRENGTH) 500 MG Oral Tab Take 1 tablet (500 mg total) by mouth every 8 (eight) hours as needed for Pain. (Patient not taking: Reported on 4/22/2024) 60 tablet 2    predniSONE 5 MG Oral  Tab Take 1 tablet (5 mg total) by mouth daily. (Patient not taking: Reported on 10/5/2023) 90 tablet 0    leflunomide 20 MG Oral Tab Take 1 tablet (20 mg total) by mouth daily. (Patient not taking: Reported on 10/5/2023) 90 tablet 0      Past Medical History:    Lipid screening    per NG    MGUS (monoclonal gammopathy of unknown significance)    Osteoporosis screening    per NG    Rheumatoid arthritis (HCC)      Past Surgical History:   Procedure Laterality Date          Other surgical history      fibroid surgery      Family History   Problem Relation Age of Onset    Prostate Cancer Father     Diabetes Father     Hypertension Mother     Musculo-skelatal Disorder Mother         spinal stenosis    Arthritis Maternal Grandmother         rheumatoid    Arthritis Paternal Grandfather         rheumatoid    Arthritis Sister         rheumatoid    Bleeding Disorders Paternal Cousin Female         sickle cell anemia    Clotting Disorder Neg       Social History:  Social History     Socioeconomic History    Marital status:    Tobacco Use    Smoking status: Never    Smokeless tobacco: Never   Vaping Use    Vaping status: Never Used   Substance and Sexual Activity    Alcohol use: No     Alcohol/week: 0.0 standard drinks of alcohol    Drug use: No   Other Topics Concern    Caffeine Concern Yes     Comment: coffee/tea/soda - 16 oz.    Social History Narrative    Patient is ; for 27 yrs, spouse Jovan. She, , and son live in [place], [place]. Gita is unable to work due to her rheumatoid arthritis.            REVIEW OF SYSTEMS:   Review Of Systems:  Fatigue  Constitutional:No fever, no change in weight or appetitie  Derm: No rashes, no oral ulcers, no alopecia, no photosensitivity, no psoriasis  HEENT: No dry eyes, no dry mouth, no Raynaud's, no nasal ulcers, no parotid swelling, no neck pain, no jaw pain, no temple pain  Eyes: No visual changes,   CVS: No chest pain, no heart disease  RS: No SOB, no  Cough, No Pleurtic pain,   GI: No nausea, no vomiiting, no abominal pain, no hx of ulcer, no gastritis, no heartburn, no dyshpagia, no BRBPR or melena  : no dysuria, no hx of miscarriages, no DVT Hx, no hx of OCP,   Neuro: No numbness or tingling, no headache, no hx of seizures,   Psych: no hx of anxiety or depression  ENDO: no hx of thyroid disease, no hx of DM  Joint/Muscluskeltal: see HPI,   All other ROS are negative.     EXAM:   LMP 09/25/2019 (Exact Date)   HEENT: Clear oropharynx, no oral ulcers, EOM intact, clear sclear, PERRLA, pleasant, no acute distress, no CAD,   No rashes  CVS:chest symmetrical  RS: no dyspnea  ABD: Soft appearing  Joint exam:   Limited motion of her shoulders  Chronic changes of her hands   Limited rom of her hips  Can't fully extend both knees - flexion contractures about 30 degrees       Component      Latest Ref Rn 10/7/2013   Anti-Sjogren's A      Negative  Negative    Anti-Sjogren's B      Negative  Negative    RHEUMATOID FACTOR      <14 IU/mL > 600    AMY SCREEN      Negative  Strong Positive !    C-Citrullinated Peptide IgG AB      0.0 - 6.9 U/ML > 340.0 (H)       Component      Latest Ref Rn 10/7/2013   Anti-Sjogren's A      Negative  Negative    Anti-Sjogren's B      Negative  Negative    RHEUMATOID FACTOR      <14 IU/mL > 600    AMY SCREEN      Negative  Strong Positive !    C-Citrullinated Peptide IgG AB      0.0 - 6.9 U/ML > 340.0 (H)       Component      Latest Ref [facility] 11/8/2021   HBSAg Screen      Nonreactive   Nonreactive    Hbsag Screen Index 0.830296    HEPATITIS B SURFACE AB QUAL      Nonreactive   Nonreactive    HEPATITIS B SURFACE AB QUANT      mIU/mL <3.10    HEPATITIS B CORE AB, TOTAL      Nonreactive   Nonreactive        Component      Latest Ref Rn 6/19/2023   WBC      4.0 - 11.0 x10(3) uL 7.5    RBC      3.80 - 5.30 x10(6)uL 3.70 (L)    Hemoglobin      12.0 - 16.0 g/dL 7.7 (L)    Hematocrit      35.0 - 48.0 % 27.9 (L)    MCV      80.0 - 100.0 fL 75.4 (L)     MCH      26.0 - 34.0 pg 20.8 (L)    MCHC      31.0 - 37.0 g/dL 27.6 (L)    RDW-SD      35.1 - 46.3 fL 53.5 (H)    RDW      11.0 - 15.0 % 20.5 (H)    Platelet Count      150.0 - 450.0 10(3)uL 782.0 (H)    Prelim Neutrophil Abs      1.50 - 7.70 x10 (3) uL 4.81    Neutrophils Absolute      1.50 - 7.70 x10(3) uL 4.81    Lymphocytes Absolute      1.00 - 4.00 x10(3) uL 1.93    Monocytes Absolute      0.10 - 1.00 x10(3) uL 0.50    Eosinophils Absolute      0.00 - 0.70 x10(3) uL 0.18    Basophils Absolute      0.00 - 0.20 x10(3) uL 0.05    Immature Granulocyte Absolute      0.00 - 1.00 x10(3) uL 0.02    Neutrophils %      % 64.1    Lymphocytes %      % 25.8    Monocytes %      % 6.7    Eosinophils %      % 2.4    Basophils %      % 0.7    Immature Granulocyte %      % 0.3    Glucose      70 - 99 mg/dL 124 (H)    Sodium      136 - 145 mmol/L 140    Potassium      3.5 - 5.1 mmol/L 3.4 (L)    Chloride      98 - 112 mmol/L 107    Carbon Dioxide, Total      21.0 - 32.0 mmol/L 28.0    ANION GAP      0 - 18 mmol/L 5    BUN      7 - 18 mg/dL 19 (H)    CREATININE      0.55 - 1.02 mg/dL 0.44 (L)    BUN/CREATININE RATIO      10.0 - 20.0  43.2 (H)    CALCIUM      8.5 - 10.1 mg/dL 9.4    CALCULATED OSMOLALITY      275 - 295 mOsm/kg 294    EGFR      >=60 mL/min/1.73m2 116    ALT (SGPT)      13 - 56 U/L 18    AST (SGOT)      15 - 37 U/L 14 (L)    ALKALINE PHOSPHATASE      41 - 108 U/L 81    Total Bilirubin      0.1 - 2.0 mg/dL 0.2    PROTEIN, TOTAL      6.4 - 8.2 g/dL 8.7 (H)    Albumin      3.4 - 5.0 g/dL 2.5 (L)    Globulin      2.8 - 4.4 g/dL 6.2 (H)    A/G Ratio      1.0 - 2.0  0.4 (L)    Patient Fasting for CMP? No    Cholesterol, Total      <200 mg/dL 147    HDL Cholesterol      40 - 59 mg/dL 40    Triglycerides      30 - 149 mg/dL 88    LDL Cholesterol Calc      <100 mg/dL 90    VLDL      0 - 30 mg/dL 14    NON-HDL CHOLESTEROL      <130 mg/dL 107    Patient Fasting for Lipid? No    HEMOGLOBIN A1c      4.8 - 5.6 % --    HEMOGLOBIN  A1c       5.5    ESTIMATED AVERAGE GLUCOSE --    C-REACTIVE PROTEIN      <0.30 mg/dL 9.23 (H)    SED RATE      0 - 30 mm/Hr 130 (H)    OCCULT BLOOD RESULT      Negative  Negative    TSH      0.358 - 3.740 mIU/mL 0.488           10/7/13  6:15 PM    AMY Titer/Pattern 640   Comment: 640 HOMOGENEOUS AMY     3/8/2027 - bone survery   1. No evidence of osteolytic or osteoblastic lesions.   2. Moderate to advanced arthritis in hips and knees.   3. 3 calcified uterine fibroids with largest measuring 9.1 cm.   4. Moderate degenerative disc disease at C5-6.   5. Thoracolumbar dextroscoliosis.   6. Minimal chronic anterior wedging of T7 vertebral body.     ASSESSMENT AND PLAN:   Gita Morales is a 54 year old female who presents for   No chief complaint on file.      1. Rheumatoid arthritis involving multiple sites with positive rheumatoid factor (HCC)  Severe destructive rheumatoid arthritis  - She has ongoing joint pain and swelling.  Diagnosed with RA in 1996.  She was previously on Humira, methotrexate and prednisone 5 mg daily up until 2017.  Then tried leflunomide 20mg a day and xeljanz 11mg ad ay   She does not follow with rheumatology since 2021and has been treating her RA with Tylenol, tramadol and naproxen as needed  - On examination she does have crhonic  findings of RA with diffuse pain and swelling  After long d/w her she would like to start back on methotrexate 15mg a week and fa 1mg a day  - today will increase to 20mg a week ,   She is holding on taking rinvoq 15mg ad ay - for now   Wants to wait another 3 months to see if methtorexate alone will help - she can try the rinvoq at anymore.     - taking ibuprofen 800mg -   - tramadol 50mg 1-2 tablets a day as needed        2.Chronic steroids in the past.   - She was on chronic steroids in the past.  Bone density in 2014 was normal.  Will likely need to repeat bone density now   On bone survery in 2017 - T7 comproession fracture noted.   - Advised to take calcium  and vitamin D supplements as she will likely be on chronic prednisone again  - hold on chronci prednisone.   - check DEXA scan    3. Biltateral osteoarthritis of both knees   S/p bilateral knee injectiosn in 4/2024 - helped   Tramadol prn   Physical teharpy helped her knees a lot -in home    4. Anemia - imrpoving - went from 7---> 9.2 - will repeat       Summary:  Check labs today , this week   2. Increase  Methtotrexate to 8  tablets a week and folic acid 1mg ad ay   3. Tramadol 50mg every 12 hours as needed  #60  4. Check chest xray -   5. Check bone density test - call [phone] -   6. If pain increase then take  rinvoq 15mg ad ay - info given   7.  Return to clinic  in 3 months    Rhonda Abarca MD  7/24/2024   12:33 PM     is applicable because the patient's medical record notes reflects the ongoing nature of the continuous longitudinal relationship of care, and the medical record indicates that there is ongoing treatment of a serious/complex medical condition.      Please note that the following visit was completed using two-way, real-time interactive audio and/or video communication.  This was done with patient consent.   This has been done in good earlene to provide continuity of care in the best interest of the provider-patient relationship, due to the ongoing public health crisis/national emergency and because of restrictions of visitation.  There are limitations of this visit as no physical exam could be performed.  Every conscious effort was taken to allow for sufficient and adequate time.  This billing was spent on reviewing labs, medications, radiology tests and decision making.  Appropriate medical decision-making and tests are ordered as detailed in the plan of care above

## 2024-07-24 NOTE — PATIENT INSTRUCTIONS
Check labs today , this week   2. Increase  Methtotrexate to 8  tablets a week and folic acid 1mg ad ay   3. Tramadol 50mg every 12 hours as needed  #60  4. Check chest xray - when planning to rinvoq   5. Check bone density test - call [phone] -   6. If pain increase then take  rinvoq 15mg ad ay - info given   7.  Return to clinic  in 3 months 10/23 at 12:00 pm

## 2024-09-16 ENCOUNTER — TELEPHONE (OUTPATIENT)
Dept: INTERNAL MEDICINE CLINIC | Facility: CLINIC | Age: 54
End: 2024-09-16

## 2024-09-17 ENCOUNTER — TELEPHONE (OUTPATIENT)
Facility: CLINIC | Age: 54
End: 2024-09-17

## 2024-09-17 NOTE — TELEPHONE ENCOUNTER
Disregard appointment request question, patient will schedule annual in person with provider.    Patient is asking PCP if okay to schedule the annual physical as a video appointment. Last appointment was on 10/5/2023   Please advise.

## 2024-09-27 ENCOUNTER — TELEPHONE (OUTPATIENT)
Dept: INTERNAL MEDICINE CLINIC | Facility: CLINIC | Age: 54
End: 2024-09-27

## 2024-09-27 NOTE — TELEPHONE ENCOUNTER
Patient called to verify forms department email address to submit Family Medical Leave Act forms. Patient also requested to have a copy of previously completed Family Medical Leave Act forms mailed to patient's home. Patient's address was verified.    Type of Leave: Continuous intermittent leave (to provide care for spouse)  Reason for Leave:   Start date of leave: 10/19/24  End date of leave: 10/18/25  How many flare ups per month/length?: 1-7 times per month lasting 1 day per episode  How many appts per month/length?:   Was Fee and Turnaround info Given?:

## 2024-10-04 NOTE — TELEPHONE ENCOUNTER
Patient called to check if Family Medical Leave Act forms were received. Forms located and logged for processing. Patient advised of current processing time.

## 2024-10-08 NOTE — TELEPHONE ENCOUNTER
Received Family Medical Leave Act Form for patients Spouse (Jovan) in the forms Department Logged for processing. Release of Information not on file. Patient does not have mychart. Patient would like forms mailed to current address on file and also faxed to [employer].

## 2024-10-15 ENCOUNTER — TELEPHONE (OUTPATIENT)
Dept: RHEUMATOLOGY | Facility: CLINIC | Age: 54
End: 2024-10-15

## 2024-10-15 NOTE — TELEPHONE ENCOUNTER
Forms completed and Efaxed to [employer] [phone]. No mychart, per patient forms mailed to current address as well.

## 2024-10-15 NOTE — TELEPHONE ENCOUNTER
Patient made appointment for a follow up for 01/29/25. Patient is requesting if this visit can be a virtual video link since she does not have mychart? Please advise

## 2024-10-15 NOTE — TELEPHONE ENCOUNTER
Dr. Cisneros,    Please sign off on form if you agree to: Intermittent Family Medical Leave Act for pts spouse due to Rheumatoid arthritis1-7 flare ups/mo lasting 1-24 hours and 1-3 medical appts/year, 1-2 Rheumatology appts/year each lasting 1-4 hours from 10/18/24 to 10/17/25    -Signature page will be the first page scanned  -From your Inbasket, Highlight the patient and click Chart   -Double click the 9/27/24 Forms Completion telephone encounter  -Scroll down to the Media section   -Click the blue Hyperlink: Family Medical Leave Act(spouse), Dr. Cisneros, 10/15/24    -Click Acknowledge located in the top right ribbon/menu   -Drag the mouse into the blank space of the document and a + sign will appear. Left click to   electronically sign the document.  -Once signed, simply exit out of the screen and you signature will be saved.     Thank you,  Gita MAIER

## 2024-10-17 NOTE — TELEPHONE ENCOUNTER
Left message for pt to sign up for My Chart and then her follow up appointment can be moved to a video visit via My Chart.

## 2024-10-17 NOTE — TELEPHONE ENCOUNTER
Video has to be done through my chart - so she has time now to down load and get the key and get it prior to the appt

## 2024-11-15 ENCOUNTER — TELEPHONE (OUTPATIENT)
Dept: INTERNAL MEDICINE CLINIC | Facility: CLINIC | Age: 54
End: 2024-11-15

## 2024-11-15 NOTE — TELEPHONE ENCOUNTER
Called pt, no answer, left VM to let pt know she is due for her mammogram and colonoscopy, let pt know she has orders pending for both and provided central scheduling number as well as a callback number

## 2024-11-26 DIAGNOSIS — M05.79 RHEUMATOID ARTHRITIS INVOLVING MULTIPLE SITES WITH POSITIVE RHEUMATOID FACTOR (HCC): ICD-10-CM

## 2024-11-26 NOTE — TELEPHONE ENCOUNTER
LOV: 7/24/24  Future Appointments   Date Time Provider Department Center   12/3/2024 11:30 AM Jerry Cisneros MD [facility]   1/29/2025 10:40 AM Rhonda Abarca MD [facility]       Summary:  Check labs today , this week   2. Increase  Methtotrexate to 8  tablets a week and folic acid 1mg ad ay   3. Tramadol 50mg every 12 hours as needed  #60  4. Check chest xray -   5. Check bone density test - call [phone] -   6. If pain increase then take  rinvoq 15mg ad ay - info given   7.  Return to clinic  in 3 months     Rhonda Abarca MD  7/24/2024   12:33 PM

## 2024-11-27 RX ORDER — UPADACITINIB 15 MG/1
1 TABLET, EXTENDED RELEASE ORAL DAILY
Qty: 30 TABLET | Refills: 5 | Status: SHIPPED | OUTPATIENT
Start: 2024-11-27

## 2024-12-09 ENCOUNTER — TELEPHONE (OUTPATIENT)
Dept: INTERNAL MEDICINE CLINIC | Facility: CLINIC | Age: 54
End: 2024-12-09

## 2024-12-10 NOTE — TELEPHONE ENCOUNTER
Please inform patient she will require an office visit and will need the name of the vendor to send order to at the visit. She can contact her insurance for this information.

## 2024-12-10 NOTE — TELEPHONE ENCOUNTER
Left message for patient informing her for insurance reasons to support her order for insurance to process the order.  Closing encounter until patient is seen and discusses the dme order at visit.

## 2024-12-10 NOTE — TELEPHONE ENCOUNTER
Talked to patient told her message below understood and she says she can't believed that doctor would like her to come into the office for her scooter. Per patient she would like to know more why because she says doctor knew that she uses a scooter.  Patient would like to talk to either doctor or nurses.

## 2025-01-22 ENCOUNTER — APPOINTMENT (OUTPATIENT)
Dept: LAB | Facility: HOSPITAL | Age: 55
End: 2025-01-22
Attending: INTERNAL MEDICINE
Payer: COMMERCIAL

## 2025-01-22 PROCEDURE — 82274 ASSAY TEST FOR BLOOD FECAL: CPT

## 2025-01-24 ENCOUNTER — OFFICE VISIT (OUTPATIENT)
Dept: INTERNAL MEDICINE CLINIC | Facility: CLINIC | Age: 55
End: 2025-01-24

## 2025-01-24 ENCOUNTER — HOSPITAL ENCOUNTER (OUTPATIENT)
Dept: MAMMOGRAPHY | Facility: HOSPITAL | Age: 55
Discharge: HOME | End: 2025-01-24
Attending: INTERNAL MEDICINE
Payer: COMMERCIAL

## 2025-01-24 ENCOUNTER — LAB ENCOUNTER (OUTPATIENT)
Dept: LAB | Facility: HOSPITAL | Age: 55
End: 2025-01-24
Attending: INTERNAL MEDICINE
Payer: COMMERCIAL

## 2025-01-24 VITALS
OXYGEN SATURATION: 98 % | BODY MASS INDEX: 21 KG/M2 | HEIGHT: 64 IN | HEART RATE: 102 BPM | DIASTOLIC BLOOD PRESSURE: 86 MMHG | SYSTOLIC BLOOD PRESSURE: 138 MMHG

## 2025-01-24 DIAGNOSIS — Z12.11 SCREEN FOR COLON CANCER: ICD-10-CM

## 2025-01-24 DIAGNOSIS — Z00.00 ADULT GENERAL MEDICAL EXAM: ICD-10-CM

## 2025-01-24 DIAGNOSIS — Z51.81 ENCOUNTER FOR THERAPEUTIC DRUG MONITORING: ICD-10-CM

## 2025-01-24 DIAGNOSIS — M05.79 RHEUMATOID ARTHRITIS INVOLVING MULTIPLE SITES WITH POSITIVE RHEUMATOID FACTOR (HCC): ICD-10-CM

## 2025-01-24 DIAGNOSIS — M05.79 RHEUMATOID ARTHRITIS INVOLVING MULTIPLE SITES WITH POSITIVE RHEUMATOID FACTOR (HCC): Primary | ICD-10-CM

## 2025-01-24 DIAGNOSIS — R92.8 ABNORMAL MAMMOGRAM: ICD-10-CM

## 2025-01-24 DIAGNOSIS — R73.09 BLOOD GLUCOSE ABNORMAL: ICD-10-CM

## 2025-01-24 LAB
ALT SERPL-CCNC: 8 U/L
ANION GAP SERPL CALC-SCNC: 13 MMOL/L (ref 0–18)
AST SERPL-CCNC: 11 U/L (ref ?–34)
BUN BLD-MCNC: 13 MG/DL (ref 9–23)
BUN/CREAT SERPL: 24.5 (ref 10–20)
CALCIUM BLD-MCNC: 9.5 MG/DL (ref 8.7–10.4)
CHLORIDE SERPL-SCNC: 104 MMOL/L (ref 98–112)
CHOLEST SERPL-MCNC: 158 MG/DL (ref ?–200)
CO2 SERPL-SCNC: 28 MMOL/L (ref 21–32)
CREAT BLD-MCNC: 0.53 MG/DL
CRP SERPL-MCNC: 8.7 MG/DL (ref ?–1)
DEPRECATED RDW RBC AUTO: 55 FL (ref 35.1–46.3)
EGFRCR SERPLBLD CKD-EPI 2021: 110 ML/MIN/1.73M2 (ref 60–?)
ERYTHROCYTE [DISTWIDTH] IN BLOOD BY AUTOMATED COUNT: 17.5 % (ref 11–15)
ERYTHROCYTE [SEDIMENTATION RATE] IN BLOOD: 57 MM/HR
FASTING PATIENT LIPID ANSWER: NO
FASTING STATUS PATIENT QL REPORTED: NO
GLUCOSE BLD-MCNC: 149 MG/DL (ref 70–99)
HCT VFR BLD AUTO: 30.1 %
HDLC SERPL-MCNC: 45 MG/DL (ref 40–59)
HEMOCCULT STL QL: NEGATIVE
HGB BLD-MCNC: 9.1 G/DL
LDLC SERPL CALC-MCNC: 98 MG/DL (ref ?–100)
MCH RBC QN AUTO: 26.2 PG (ref 26–34)
MCHC RBC AUTO-ENTMCNC: 30.2 G/DL (ref 31–37)
MCV RBC AUTO: 86.7 FL
NONHDLC SERPL-MCNC: 113 MG/DL (ref ?–130)
OSMOLALITY SERPL CALC.SUM OF ELEC: 303 MOSM/KG (ref 275–295)
PLATELET # BLD AUTO: 618 10(3)UL (ref 150–450)
POTASSIUM SERPL-SCNC: 3.5 MMOL/L (ref 3.5–5.1)
RBC # BLD AUTO: 3.47 X10(6)UL
SODIUM SERPL-SCNC: 145 MMOL/L (ref 136–145)
TRIGL SERPL-MCNC: 78 MG/DL (ref 30–149)
TSI SER-ACNC: 1.08 UIU/ML (ref 0.55–4.78)
VLDLC SERPL CALC-MCNC: 13 MG/DL (ref 0–30)
WBC # BLD AUTO: 7.3 X10(3) UL (ref 4–11)

## 2025-01-24 PROCEDURE — 36415 COLL VENOUS BLD VENIPUNCTURE: CPT

## 2025-01-24 PROCEDURE — 86140 C-REACTIVE PROTEIN: CPT

## 2025-01-24 PROCEDURE — 85027 COMPLETE CBC AUTOMATED: CPT

## 2025-01-24 PROCEDURE — 77066 DX MAMMO INCL CAD BI: CPT | Performed by: INTERNAL MEDICINE

## 2025-01-24 PROCEDURE — 84450 TRANSFERASE (AST) (SGOT): CPT

## 2025-01-24 PROCEDURE — 80061 LIPID PANEL: CPT

## 2025-01-24 PROCEDURE — 84443 ASSAY THYROID STIM HORMONE: CPT

## 2025-01-24 PROCEDURE — 77062 BREAST TOMOSYNTHESIS BI: CPT | Performed by: INTERNAL MEDICINE

## 2025-01-24 PROCEDURE — 85652 RBC SED RATE AUTOMATED: CPT

## 2025-01-24 PROCEDURE — 80048 BASIC METABOLIC PNL TOTAL CA: CPT

## 2025-01-24 PROCEDURE — 84460 ALANINE AMINO (ALT) (SGPT): CPT

## 2025-01-24 PROCEDURE — 83036 HEMOGLOBIN GLYCOSYLATED A1C: CPT

## 2025-01-24 RX ORDER — PREDNISOLONE 5 MG/1
5 TABLET ORAL DAILY
COMMUNITY
Start: 2022-04-01 | End: 2025-01-24

## 2025-01-24 RX ORDER — ACETAMINOPHEN 500 MG
500 TABLET ORAL EVERY 6 HOURS PRN
COMMUNITY

## 2025-01-24 RX ORDER — METHOTREXATE 2.5 MG/1
20 TABLET ORAL WEEKLY
Refills: 0 | Status: CANCELLED | OUTPATIENT
Start: 2025-01-24

## 2025-01-24 RX ORDER — IBUPROFEN 800 MG/1
800 TABLET, FILM COATED ORAL EVERY 6 HOURS PRN
Qty: 360 TABLET | Refills: 1 | Status: CANCELLED | OUTPATIENT
Start: 2025-01-24

## 2025-01-24 RX ORDER — METHOTREXATE 2.5 MG/1
20 TABLET ORAL WEEKLY
COMMUNITY
Start: 2025-01-13

## 2025-01-24 RX ORDER — PREDNISONE 5 MG/1
5 TABLET ORAL DAILY
Qty: 90 TABLET | Refills: 0 | Status: CANCELLED | OUTPATIENT
Start: 2025-01-24

## 2025-01-24 RX ORDER — FOLIC ACID 1 MG/1
1 TABLET ORAL DAILY
Qty: 90 TABLET | Refills: 3 | Status: CANCELLED | OUTPATIENT
Start: 2025-01-24 | End: 2026-01-24

## 2025-01-24 NOTE — PROGRESS NOTES
Gita Morales is a 54 year old female.  Chief Complaint   Patient presents with    Physical    Medication Request     Would like to know if she can get a prescription for acetaminophen     HPI:     53 -year-old lady with a past medical history of rheumatoid arthritis, MGUS, iron deficiency anemia here for physical.  She is wheelchair dependent.   Her  is a primary caregiver.  She is not able to move around without the help of assistive device/her .  She established care with Dr. BYNUM for rheumatoid arthritis.  She continues to follow-up with Dr. Sullivan.  She is scheduled to complete her mammography today.  She does not want to do colonoscopy.     Past medical history rheumatoid arthritis, monoclonal gammopathy of unknown significance     Past sickle history      She is allergic to erythromycin and penicillin.     She does not smoke, denies alcohol or recreational drug abuse.    FHX no breast or colon cancer in family     Current Outpatient Medications   Medication Sig Dispense Refill    acetaminophen 500 MG Oral Tab Take 1 tablet (500 mg total) by mouth every 6 (six) hours as needed for Pain.      methotrexate 2.5 MG Oral Tab Take 8 tablets (20 mg total) by mouth once a week.      RINVOQ 15 MG Oral Tablet 24 Hr TAKE 1 TABLET DAILY 30 tablet 5    traMADol 50 MG Oral Tab Take 1 tablet (50 mg total) by mouth every 12 (twelve) hours as needed for Pain. 60 tablet 5    ibuprofen 800 MG Oral Tab Take 1 tablet (800 mg total) by mouth every 6 (six) hours as needed for Pain. 360 tablet 1    folic acid 1 MG Oral Tab Take 1 tablet (1 mg total) by mouth daily. 90 tablet 3    predniSONE 5 MG Oral Tab Take 1 tablet (5 mg total) by mouth daily. 90 tablet 0      Past Medical History:    Lipid screening    per NG    MGUS (monoclonal gammopathy of unknown significance)    Osteoporosis screening    per NG    Rheumatoid arthritis (HCC)      Past Surgical History:   Procedure Laterality Date          Other  surgical history  2008    fibroid surgery      Social History:  Social History     Socioeconomic History    Marital status:    Tobacco Use    Smoking status: Never    Smokeless tobacco: Never   Vaping Use    Vaping status: Never Used   Substance and Sexual Activity    Alcohol use: No     Alcohol/week: 0.0 standard drinks of alcohol    Drug use: No   Other Topics Concern    Caffeine Concern Yes     Comment: coffee/tea/soda - 16 oz.    Social History Narrative    Patient is ; for 27 yrs, spouse Jovan. She, , and son live in [place], [place]. Gita is unable to work due to her rheumatoid arthritis.      Social Drivers of Health     Food Insecurity: No Food Insecurity (1/24/2025)    NCSS - Food Insecurity     Worried About Running Out of Food in the Last Year: No     Ran Out of Food in the Last Year: No   Transportation Needs: No Transportation Needs (1/24/2025)    NCSS - Transportation     Lack of Transportation: No   Housing Stability: Not At Risk (1/24/2025)    NCSS - Housing/Utilities     Has Housing: Yes     Worried About Losing Housing: No     Unable to Get Utilities: No      Family History   Problem Relation Age of Onset    Prostate Cancer Father     Diabetes Father     Hypertension Mother     Musculo-skelatal Disorder Mother         spinal stenosis    Arthritis Maternal Grandmother         rheumatoid    Arthritis Paternal Grandfather         rheumatoid    Arthritis Sister         rheumatoid    Bleeding Disorders Paternal Cousin Female         sickle cell anemia    Clotting Disorder Neg       Allergies[1]     REVIEW OF SYSTEMS:   Review of Systems   Review of Systems   Constitutional: Negative for activity change, appetite change and fever.   HENT: Negative for congestion and voice change.    Respiratory: Negative for cough and shortness of breath.    Cardiovascular: Negative for chest pain.   Gastrointestinal: Negative for abdominal distention, abdominal pain and vomiting.   Genitourinary:  Negative for hematuria.   Skin: Negative for wound.   Psychiatric/Behavioral: Negative for behavioral problems.   Wt Readings from Last 5 Encounters:   10/05/23 125 lb (56.7 kg)   09/30/21 125 lb (56.7 kg)   09/22/20 120 lb (54.4 kg)   10/01/19 125 lb (56.7 kg)   09/27/18 130 lb 3.2 oz (59.1 kg)     Body mass index is 21.46 kg/m².      EXAM:   /86   Pulse 102   Ht 5' 4\" (1.626 m)   LMP 09/25/2019 (Exact Date)   SpO2 98%   BMI 21.46 kg/m²   Physical Exam   Constitutional:       Appearance: Normal appearance.   HENT:      Head: Normocephalic.    Cardiovascular:      Rate and Rhythm: Normal rate and regular rhythm.      Heart sounds: Normal heart sounds. No murmur heard.  Pulmonary:      Effort: Pulmonary effort is normal.      Breath sounds: Normal breath sounds. No rhonchi or rales.   Abdominal:      General: Bowel sounds are normal.      Palpations: Abdomen is soft.      Tenderness: There is no abdominal tenderness.   Musculoskeletal:      Cervical back: Neck supple.      Right lower leg: No edema.      Left lower leg: No edema.   Skin:     General: Skin is warm and dry.   Neurological:      General: No focal deficit present.      Mental Status: He is alert and oriented to person, place, and time. Mental status is at baseline.   Psychiatric:         Mood and Affect: Mood normal.         Behavior: Behavior normal.       ASSESSMENT AND PLAN:   1. Rheumatoid arthritis involving multiple sites with positive rheumatoid factor (HCC)  Again, I had a lengthy discussion with her regarding the importance of following up with rheumatology.  She already has appointment.  She informed me that she is taking methotrexate and folic acid now.  She will complete blood testing that was ordered by rheumatology today.  - Basic Metabolic Panel (8); Future  - Lipid Panel; Future  - TSH W Reflex To Free T4; Future    2. Screen for colon cancer    - Occult Blood, Fecal, FIT Immunoassay; Future      General medical exam  Discussed  regarding the importance of following up with rheumatology and take appropriate medications to control her rheumatoid arthritis.  She will complete mammography today.  She will complete stool testing today.  Discussed regarding narcotic usage.  Her  is a primary caregiver.  I will approve his FMLA forms when needed.  Reviewed vaccines.  Discussed fall prevention.    Plan: As above.      The patient indicates understanding of these issues and agrees to the plan.  No follow-ups on file.    This note was prepared using Dragon Medical voice recognition dictation software. As a result errors may occur. When identified these errors have been corrected. While every attempt is made to correct errors during dictation discrepancies may still exist.         [1]   Allergies  Allergen Reactions    Erythromycin      Other reaction(s): nausea    Penicillins      Other reaction(s): rash

## 2025-01-26 LAB
EST. AVERAGE GLUCOSE BLD GHB EST-MCNC: 105 MG/DL (ref 68–126)
HBA1C MFR BLD: 5.3 % (ref ?–5.7)

## 2025-01-27 DIAGNOSIS — D64.9 ANEMIA, UNSPECIFIED TYPE: Primary | ICD-10-CM

## 2025-01-28 DIAGNOSIS — M05.79 RHEUMATOID ARTHRITIS INVOLVING MULTIPLE SITES WITH POSITIVE RHEUMATOID FACTOR (HCC): ICD-10-CM

## 2025-01-28 NOTE — TELEPHONE ENCOUNTER
Future Appointments  Future Appointments (Rheumatology)       Visit Type Date Time Department    FOLLOW UP VISIT 4/7/2025 11:40 AM [facility]OLOGY            Recent Appointments  Recent Outpatient Visits              07/24/2024 Rheumatoid arthritis involving multiple sites with positive rheumatoid factor ([facility])    [facility]    [facility]icine    04/22/2024 Rheumatoid arthritis involving multiple sites with positive rheumatoid factor ([facility]    [facility] Visit    11/08/2021 Rheumatoid arthritis involving multiple sites with positive rheumatoid factor ([facility]    Office Visit    08/12/2017 Rheumatoid arthritis involving multiple sites with positive rheumatoid factor ([facility]    Office Visit    10/28/2016 Paraproteinemia ([facility]    [facility]            Recent Labs (6 months)  Recent Results (from the past 4380 hours)   CBC, Platelet; No Differential    Collection Time: 01/24/25 12:24 PM   Result Value Ref Range    WBC 7.3 4.0 - 11.0 x10(3) uL    RBC 3.47 (L) 3.80 - 5.30 x10(6)uL    HGB 9.1 (L) 12.0 - 16.0 g/dL    HCT 30.1 (L) 35.0 - 48.0 %    MCV 86.7 80.0 - 100.0 fL    MCH 26.2 26.0 - 34.0 pg    MCHC 30.2 (L) 31.0 - 37.0 g/dL    RDW 17.5 (H) 11.0 - 15.0 %    RDW-SD 55.0 (H) 35.1 - 46.3 fL    .0 (H) 150.0 - 450.0 10(3)uL     *Note: Due to a large number of results and/or encounters for the requested time period, some results have not been displayed. A complete set of results can be found in Results Review.     Lab Results   Component Value Date    AST 11 01/24/2025     Lab Results   Component Value Date    ALT 8 (L) 01/24/2025     Lab Results    Component Value Date    ESRML 57 (H) 01/24/2025     No results found for: \"ALB\"  Lab Results   Component Value Date    CREATSERUM 0.53 (L) 01/24/2025     Lab Results   Component Value Date    ESRML 57 (H) 01/24/2025     Lab Results   Component Value Date    CRP 8.70 (H) 01/24/2025     No results found for: \"URIC\"  No results found for: \"QFGOLDRESULT\"

## 2025-01-28 NOTE — TELEPHONE ENCOUNTER
Requested Prescriptions     Pending Prescriptions Disp Refills    TRAMADOL 50 MG Oral Tab [Pharmacy Med Name: TRAMADOL 50MG TABLETS] 60 tablet 0     Sig: TAKE 1 TABLET(50 MG) BY MOUTH EVERY 12 HOURS AS NEEDED FOR PAIN       Future Appointments   Date Time Provider Department Center   4/7/2025 11:40 AM Rhonda Abarca MD [facility]  [facility]     LOV: 7/24/24 Telemedicine   Last Refilled:7/24/24 #60 5RF   Labs:  Component      Latest Ref Rng 1/24/2025   WBC      4.0 - 11.0 x10(3) uL 7.3    RBC      3.80 - 5.30 x10(6)uL 3.47 (L)    Hemoglobin      12.0 - 16.0 g/dL 9.1 (L)    Hematocrit      35.0 - 48.0 % 30.1 (L)    MCV      80.0 - 100.0 fL 86.7    MCH      26.0 - 34.0 pg 26.2    MCHC      31.0 - 37.0 g/dL 30.2 (L)    RDW      11.0 - 15.0 % 17.5 (H)    RDW-SD      35.1 - 46.3 fL 55.0 (H)    Platelet Count      150.0 - 450.0 10(3)uL 618.0 (H)    AST (SGOT)      <34 U/L 11    C-REACTIVE PROTEIN      <1.00 mg/dL 8.70 (H)    SED RATE      0 - 30 mm/Hr 57 (H)    ALT (SGPT)      10 - 49 U/L 8 (L)       Legend:  (L) Low  (H) High  \       Summary:  Check labs today , this week   2. Increase  Methtotrexate to 8  tablets a week and folic acid 1mg ad ay   3. Tramadol 50mg every 12 hours as needed  #60  4. Check chest xray -   5. Check bone density test - call [phone] -   6. If pain increase then take  rinvoq 15mg ad ay - info given   7.  Return to clinic  in 3 months     Rhonda Abarca MD  7/24/2024   12:33 PM

## 2025-01-28 NOTE — TELEPHONE ENCOUNTER
Patient is requesting refill for predniSONE 5 MG Oral Tab ,     folic acid 1 MG Oral Tab,     ibuprofen 800 MG Oral Tab, methotrexate 2.5 MG Oral Tab             [id number] - [facility] [address] AT [facility], [phone], [phone]

## 2025-01-29 RX ORDER — IBUPROFEN 800 MG/1
800 TABLET, FILM COATED ORAL EVERY 6 HOURS PRN
Qty: 360 TABLET | Refills: 1 | Status: SHIPPED | OUTPATIENT
Start: 2025-01-29

## 2025-01-29 RX ORDER — METHOTREXATE 2.5 MG/1
20 TABLET ORAL WEEKLY
Qty: 104 TABLET | Refills: 0 | Status: SHIPPED | OUTPATIENT
Start: 2025-01-29

## 2025-01-29 RX ORDER — PREDNISONE 5 MG/1
5 TABLET ORAL DAILY
Qty: 90 TABLET | Refills: 0 | Status: SHIPPED | OUTPATIENT
Start: 2025-01-29

## 2025-01-29 RX ORDER — TRAMADOL HYDROCHLORIDE 50 MG/1
50 TABLET ORAL EVERY 12 HOURS PRN
Qty: 60 TABLET | Refills: 3 | Status: SHIPPED | OUTPATIENT
Start: 2025-01-29

## 2025-01-29 RX ORDER — FOLIC ACID 1 MG/1
1 TABLET ORAL DAILY
Qty: 90 TABLET | Refills: 3 | Status: SHIPPED | OUTPATIENT
Start: 2025-01-29 | End: 2026-01-29

## 2025-02-25 NOTE — TELEPHONE ENCOUNTER
Ankita asking for approval to add Occupational Therapy. PT plan of care includes twice a week for two weeks, then once a week for one week for strengthening, balance, transfers and pain management.
Ankita from [place] [id number] called to inform  Physical therapy evaluation is being moved to next week.
Mark Dunham made aware of order below with understanding.
Ok dx RA
Quality 226: Preventive Care And Screening: Tobacco Use: Screening And Cessation Intervention: Patient screened for tobacco use and is an ex/non-smoker
Quality 431: Preventive Care And Screening: Unhealthy Alcohol Use - Screening: Patient not identified as an unhealthy alcohol user when screened for unhealthy alcohol use using a systematic screening method
Detail Level: Detailed

## 2025-03-21 ENCOUNTER — TELEPHONE (OUTPATIENT)
Age: 55
End: 2025-03-21

## 2025-03-21 DIAGNOSIS — M05.79 RHEUMATOID ARTHRITIS INVOLVING MULTIPLE SITES WITH POSITIVE RHEUMATOID FACTOR (HCC): Primary | ICD-10-CM

## 2025-03-21 NOTE — TELEPHONE ENCOUNTER
Patient has an upcoming follow up appt with Dr. Abarca/rheum. Patient has HMO insurance and there is no referral on file.

## 2025-03-26 ENCOUNTER — TELEPHONE (OUTPATIENT)
Dept: INTERNAL MEDICINE CLINIC | Facility: CLINIC | Age: 55
End: 2025-03-26

## 2025-04-07 ENCOUNTER — TELEPHONE (OUTPATIENT)
Dept: RHEUMATOLOGY | Facility: CLINIC | Age: 55
End: 2025-04-07

## 2025-04-07 ENCOUNTER — TELEMEDICINE (OUTPATIENT)
Age: 55
End: 2025-04-07

## 2025-04-07 DIAGNOSIS — M17.0 BILATERAL PRIMARY OSTEOARTHRITIS OF KNEE: ICD-10-CM

## 2025-04-07 DIAGNOSIS — M81.0 AGE-RELATED OSTEOPOROSIS WITHOUT CURRENT PATHOLOGICAL FRACTURE: ICD-10-CM

## 2025-04-07 DIAGNOSIS — Z51.81 ENCOUNTER FOR THERAPEUTIC DRUG MONITORING: ICD-10-CM

## 2025-04-07 DIAGNOSIS — M05.79 RHEUMATOID ARTHRITIS INVOLVING MULTIPLE SITES WITH POSITIVE RHEUMATOID FACTOR (HCC): Primary | ICD-10-CM

## 2025-04-07 DIAGNOSIS — R76.12 FALSE POSITIVE QUANTIFERON-TB GOLD TEST: ICD-10-CM

## 2025-04-07 RX ORDER — PREDNISONE 5 MG/1
5 TABLET ORAL DAILY
Qty: 90 TABLET | Refills: 3 | Status: SHIPPED | OUTPATIENT
Start: 2025-04-07

## 2025-04-07 RX ORDER — TRAMADOL HYDROCHLORIDE 50 MG/1
50 TABLET ORAL EVERY 12 HOURS PRN
Qty: 60 TABLET | Refills: 5 | Status: SHIPPED | OUTPATIENT
Start: 2025-04-07

## 2025-04-07 NOTE — TELEPHONE ENCOUNTER
Patient is unable to see the doctor in person today for her 1140am appointment because her caregiver can not drive her. Patient is asking if she can make the appointment today a virtual appointment, instead?

## 2025-04-07 NOTE — TELEPHONE ENCOUNTER
Called patient back and left voicemail that her appointment for today has been changed to a virtual visit.

## 2025-04-07 NOTE — PATIENT INSTRUCTIONS
Take rinvoq 15mg a day -0 will see if this helps your knee pain   2. Cont.Methtotrexate to 8  tablets a week and folic acid 1mg ad ay   3. Tramadol 50mg every 12 hours as needed  #60  4. Cont. Prednisone 5mg a day    5. Check bone density test - call [phone] -   6.  Check chest xray -  7.  Return to clinic  in 3 months  8/ check labs at end of months  4/29

## 2025-04-07 NOTE — PROGRESS NOTES
Giat Morales is a 54 year old female who presents for   No chief complaint on file.    This visit is conducted using Telemedicine with live, interactive video and audio.    Patient has been referred to the [facility] website at [email or web address] to review the yearly Consent to Treat document.    Patient understands and accepts financial responsibility for any deductible, co-insurance and/or co-pays associated with this service.      HPI:     I had the pleasure of seeing Gita Morales on 4/22/2024 for evaluation.     This is a 52 yo F with hx of MGUS, Fe Deficiency Anemia, RA presents to [facility].  She was diagnosed with rheumatoid arthritis more than 20 years ago around 1996.  She was following with Dr. Hazel from 2013  up until 2017.  She states about 13 years ago she was mobile, able to do things for herself, walk and cook.  Over the past 10 years her mobility has been limited.  Now uses a motorized wheelchair to get around.    Started seeing dr. Hazel in 10/2013 - transferred her care from [facility]. She developed rheumatoid arthritis at age 25.  She recalls her rheumatoid factor being positive. She recalls being started on sulfasalazine, which did not help. She was on methotrexate for several years, which she recalls was discontinued because she got pregnant. She has been on Humira probably for 6 years, which has helped. She has been on prednisone on and off. She will often get vaginal yeast infections while on it. She is presently on 2.5 mg of prednisone a day. Naproxen 500 mg twice a day, which helps. Humira every week. Celebrex helped it every day. Ibuprofen caused occasional dizziness when she stood up.     She stopped her meds in 2017 and had not seen rheumatology since then til seeing dr. Hernandez in 2021. .  She currently is controlling her pain with tramadol, Tylenol and naproxen as needed.  She has pain and swelling in a lot of her joints.  She has limited range of motion in her  shoulders.  Not able to extend her knees.  She reports swelling in her hands, wrists, elbows and knees.  It is very difficult her for to stand up.  She also reports chronic neck pain with limited range of motion.  In the past she would get cortisone injections in the knees.she's in a motorized wheel chair       She is here on 4/22/2024  She last saw , rheumatology  in 2021.   She took ibuprofne 800mg otc. She also taking old tramadol. It's all she's been taking.     It's hard for her to get here. Her hsuband takes her to all her visit - works 6 days a week. So its' difficult for her to come in.   She was on meloxicam - but makes her stomach hurt but she stopped taking it. -    Dr. Cisneros has been caring for her.   She tried humira, methotrexate, leflunomide and   She has been using an electric motorized wheelchair - she hasn't been able to get up since 2012. S  She tried xeljanz with dr. Hernandez for 6-12 months and it dind't work well for her.   She also started leflunomide 20mg ad ay with dr. Hernandez but she doesn't remember how it helped her.     She can't remember a signicant change.   She used to take prednisone but now off of it.   She has 910 pain with pain everywhere.   Her arms and body is stiff.   She can't raise her arms, she has chronic changes in her wrists, and hands.   She can't move her hips, knees and feet well - as well as ankles.   She doesn't walk.   She sits in a chair most of the time.     She was getting around on medication but not able to feel great.   She feels better- a little better today - kristy after taking steroids.     This weekend hse saw the eye doctor -         Dr. Hazel i     She saw Dr. Sullivan hematology - in March 2017.  Bone survey was negative.  Haptoglobin high.  She has an IgG lambda monoclonal, and has monoclonal gammopathy of unknown significance.      7/24/2024  VIDEO VISIT  She is on the methotrexate   She hasn't gotten a chest xray and never took the rinvoq -   She  was ok - b/c she wanted to see if the methotrexate was working - she actually likes it and doing a lot better.   She's not even sure she's ready to start the prescription.     She is feeling better.   She has a lot o let pain than when she came in   She feels right now she is struggling with her knee pain.   The steroid shots did help the knees - she feels the pain went down 50% - pain went from 10 to 5/10 pain.   In home physical therapy did well for her knees -     2025  VIDEO VISIT  Her pain is better on increasing hte methotrexate - mostly it's the knees.   5/10 pain -   She is feeling better   Ssh'es on methtorexte 8 tablets a week   Her esr is 57mm/hr - 50% better than in the past   She can't remember how long the knee injecttions lasted -         Wt Readings from Last 2 Encounters:   10/05/23 125 lb (56.7 kg)   21 125 lb (56.7 kg)     There is no height or weight on file to calculate BMI.      Current Outpatient Medications   Medication Sig Dispense Refill    predniSONE 5 MG Oral Tab Take 1 tablet (5 mg total) by mouth daily. 90 tablet 0    methotrexate 2.5 MG Oral Tab Take 8 tablets (20 mg total) by mouth once a week. 104 tablet 0    folic acid 1 MG Oral Tab Take 1 tablet (1 mg total) by mouth daily. 90 tablet 3    ibuprofen 800 MG Oral Tab Take 1 tablet (800 mg total) by mouth every 6 (six) hours as needed for Pain. 360 tablet 1    traMADol 50 MG Oral Tab Take 1 tablet (50 mg total) by mouth every 12 (twelve) hours as needed. 60 tablet 3    acetaminophen 500 MG Oral Tab Take 1 tablet (500 mg total) by mouth every 6 (six) hours as needed for Pain.      RINVOQ 15 MG Oral Tablet 24 Hr TAKE 1 TABLET DAILY 30 tablet 5      Past Medical History:    Lipid screening    per NG    MGUS (monoclonal gammopathy of unknown significance)    Osteoporosis screening    per NG    Rheumatoid arthritis (HCC)      Past Surgical History:   Procedure Laterality Date          Other surgical history       fibroid surgery      Family History   Problem Relation Age of Onset    Prostate Cancer Father     Diabetes Father     Hypertension Mother     Musculo-skelatal Disorder Mother         spinal stenosis    Arthritis Maternal Grandmother         rheumatoid    Arthritis Paternal Grandfather         rheumatoid    Arthritis Sister         rheumatoid    Bleeding Disorders Paternal Cousin Female         sickle cell anemia    Clotting Disorder Neg       Social History:  Social History     Socioeconomic History    Marital status:    Tobacco Use    Smoking status: Never    Smokeless tobacco: Never   Vaping Use    Vaping status: Never Used   Substance and Sexual Activity    Alcohol use: No     Alcohol/week: 0.0 standard drinks of alcohol    Drug use: No   Other Topics Concern    Caffeine Concern Yes     Comment: coffee/tea/soda - 16 oz.    Social History Narrative    Patient is ; for 27 yrs, spouse Jovan. She, , and son live in [place], [place]. Gita is unable to work due to her rheumatoid arthritis.      Social Drivers of Health     Food Insecurity: No Food Insecurity (1/24/2025)    NCSS - Food Insecurity     Worried About Running Out of Food in the Last Year: No     Ran Out of Food in the Last Year: No   Transportation Needs: No Transportation Needs (1/24/2025)    NCSS - Transportation     Lack of Transportation: No   Housing Stability: Not At Risk (1/24/2025)    NCSS - Housing/Utilities     Has Housing: Yes     Worried About Losing Housing: No     Unable to Get Utilities: No           REVIEW OF SYSTEMS:   Review Of Systems:  Fatigue  Constitutional:No fever, no change in weight or appetitie  Derm: No rashes, no oral ulcers, no alopecia, no photosensitivity, no psoriasis  HEENT: No dry eyes, no dry mouth, no Raynaud's, no nasal ulcers, no parotid swelling, no neck pain, no jaw pain, no temple pain  Eyes: No visual changes,   CVS: No chest pain, no heart disease  RS: No SOB, no Cough, No Pleurtic pain,   GI:  No nausea, no vomiiting, no abominal pain, no hx of ulcer, no gastritis, no heartburn, no dyshpagia, no BRBPR or melena  : no dysuria, no hx of miscarriages, no DVT Hx, no hx of OCP,   Neuro: No numbness or tingling, no headache, no hx of seizures,   Psych: no hx of anxiety or depression  ENDO: no hx of thyroid disease, no hx of DM  Joint/Muscluskeltal: see HPI,   All other ROS are negative.     EXAM:   LMP 09/25/2019 (Exact Date)   HEENT: Clear oropharynx, no oral ulcers, EOM intact, clear sclear, PERRLA, pleasant, no acute distress, no CAD,   No rashes  CVS:chest symmetrical  RS: no dyspnea  ABD: Soft appearing  Joint exam:   Limited motion of her shoulders  Chronic changes of her hands   Limited rom of her hips  Can't fully extend both knees - flexion contractures about 30 degrees       Component      Latest Ref Rng 10/7/2013   Anti-Sjogren's A      Negative  Negative    Anti-Sjogren's B      Negative  Negative    RHEUMATOID FACTOR      <14 IU/mL > 600    AMY SCREEN      Negative  Strong Positive !    C-Citrullinated Peptide IgG AB      0.0 - 6.9 U/ML > 340.0 (H)       Component      Latest Ref Rng 10/7/2013   Anti-Sjogren's A      Negative  Negative    Anti-Sjogren's B      Negative  Negative    RHEUMATOID FACTOR      <14 IU/mL > 600    AMY SCREEN      Negative  Strong Positive !    C-Citrullinated Peptide IgG AB      0.0 - 6.9 U/ML > 340.0 (H)       Component      Latest Ref Rng 11/8/2021   HBSAg Screen      Nonreactive   Nonreactive    Hbsag Screen Index 0.743874    HEPATITIS B SURFACE AB QUAL      Nonreactive   Nonreactive    HEPATITIS B SURFACE AB QUANT      mIU/mL <3.10    HEPATITIS B CORE AB, TOTAL      Nonreactive   Nonreactive        Component      Latest Ref Rng 1/24/2025   Glucose      70 - 99 mg/dL 149 (H)    Sodium      136 - 145 mmol/L 145    Potassium      3.5 - 5.1 mmol/L 3.5    Chloride      98 - 112 mmol/L 104    Carbon Dioxide, Total      21.0 - 32.0 mmol/L 28.0    ANION GAP      0 - 18  mmol/L 13    BUN      9 - 23 mg/dL 13    CREATININE      0.55 - 1.02 mg/dL 0.53 (L)    BUN/CREATININE RATIO      10.0 - 20.0  24.5 (H)    CALCIUM      8.7 - 10.4 mg/dL 9.5    CALCULATED OSMOLALITY      275 - 295 mOsm/kg 303 (H)    EGFR      >=60 mL/min/1.73m2 110    Patient Fasting for BMP? No    WBC      4.0 - 11.0 x10(3) uL 7.3    RBC      3.80 - 5.30 x10(6)uL 3.47 (L)    Hemoglobin      12.0 - 16.0 g/dL 9.1 (L)    Hematocrit      35.0 - 48.0 % 30.1 (L)    MCV      80.0 - 100.0 fL 86.7    MCH      26.0 - 34.0 pg 26.2    MCHC      31.0 - 37.0 g/dL 30.2 (L)    RDW      11.0 - 15.0 % 17.5 (H)    RDW-SD      35.1 - 46.3 fL 55.0 (H)    Platelet Count      150.0 - 450.0 10(3)uL 618.0 (H)    Cholesterol, Total      <200 mg/dL 158    HDL Cholesterol      40 - 59 mg/dL 45    Triglycerides      30 - 149 mg/dL 78    LDL Cholesterol Calc      <100 mg/dL 98    VLDL      0 - 30 mg/dL 13    NON-HDL CHOLESTEROL      <130 mg/dL 113    Patient Fasting for Lipid? No    HEMOGLOBIN A1c      <5.7 % 5.3    ESTIMATED AVERAGE GLUCOSE      68 - 126 mg/dL 105    AST (SGOT)      <34 U/L 11    C-REACTIVE PROTEIN      <1.00 mg/dL 8.70 (H)    SED RATE      0 - 30 mm/Hr 57 (H)    ALT (SGPT)      10 - 49 U/L 8 (L)    TSH      0.550 - 4.780 uIU/mL 1.080       Legend:  (H) High  (L) Low    10/7/13  6:15 PM    AMY Titer/Pattern 640   Comment: 640 HOMOGENEOUS AMY     3/8/2027 - bone survery   1. No evidence of osteolytic or osteoblastic lesions.   2. Moderate to advanced arthritis in hips and knees.   3. 3 calcified uterine fibroids with largest measuring 9.1 cm.   4. Moderate degenerative disc disease at C5-6.   5. Thoracolumbar dextroscoliosis.   6. Minimal chronic anterior wedging of T7 vertebral body.     ASSESSMENT AND PLAN:   Gita Morales is a 54 year old female who presents for   No chief complaint on file.      1. Rheumatoid arthritis involving multiple sites with positive rheumatoid factor (HCC)  Severe destructive rheumatoid arthritis  -  She has ongoing joint pain and swelling.  Diagnosed with RA in 1996.  She was previously on Humira, methotrexate and prednisone 5 mg daily up until 2017.  Then tried leflunomide 20mg a day and xeljanz 11mg ad ay   She does not follow with rheumatology since 2021and has been treating her RA with Tylenol, tramadol and naproxen as needed  - On examination she does have crhonic  findings of RA with diffuse pain and swelling  After long d/w her she would like to start back on methotrexate 15mg a week and fa 1mg a day  - today will increase to 20mg a week ,   She is holding on taking rinvoq 15mg ad ay - for now   She Wanted to wait another 3 months to see if methtorexate alone will help -  But asked her now to try rinvoq 15mg a day      - taking ibuprofen 800mg -   - tramadol 50mg 1-2 tablets a day as needed        2.Chronic steroids in the past.   - She was on chronic steroids in the past.  Bone density in 2014 was normal.  Will likely need to repeat bone density now   On bone survery in 2017 - T7 comproession fracture noted.   - Advised to take calcium and vitamin D supplements as she will likely be on chronic prednisone again  - hold on chronci prednisone.   - check DEXA scan    3. Biltateral osteoarthritis of both knees   S/p bilateral knee injectiosn in 4/2024 - helped   Tramadol prn   Physical teharpy helped her knees a lot -in home    4. Anemia - imrpoving - went from 7---> 9.2 - will repeat       Summary:  Take rinvoq 15mg a day -0 will see if this helps your knee pain   2. Cont.Methtotrexate to 8  tablets a week and folic acid 1mg ad ay   3. Tramadol 50mg every 12 hours as needed  #60  4. Cont. Prednisone 5mg a day    5. Check bone density test - call [phone] -   6.  Check chest xray -  7.  Return to clinic  in 3 months  8/ check labs at end of months  4/29     Rhonda Abarca MD  4/7/2025   11:55 AM         is applicable because the patient's medical record notes reflects the ongoing nature of the  continuous longitudinal relationship of care, and the medical record indicates that there is ongoing treatment of a serious/complex medical condition.      Please note that the following visit was completed using two-way, real-time interactive audio and/or video communication.  This was done with patient consent.   This has been done in good earlene to provide continuity of care in the best interest of the provider-patient relationship, due to the ongoing public health crisis/national emergency and because of restrictions of visitation.  There are limitations of this visit as no physical exam could be performed.  Every conscious effort was taken to allow for sufficient and adequate time.  This billing was spent on reviewing labs, medications, radiology tests and decision making.  Appropriate medical decision-making and tests are ordered as detailed in the plan of care above

## 2025-04-25 ENCOUNTER — TELEPHONE (OUTPATIENT)
Dept: RHEUMATOLOGY | Facility: CLINIC | Age: 55
End: 2025-04-25

## 2025-04-25 NOTE — TELEPHONE ENCOUNTER
Patient called requesting when they should take the blood work, at the end of April or the end of May.     Please call patient

## 2025-04-28 NOTE — TELEPHONE ENCOUNTER
Name and  verified. Pt advised to have blood work completed the end of April per provider last office visit note.

## 2025-05-09 ENCOUNTER — HOSPITAL ENCOUNTER (OUTPATIENT)
Dept: GENERAL RADIOLOGY | Facility: HOSPITAL | Age: 55
Discharge: HOME | End: 2025-05-09
Attending: INTERNAL MEDICINE
Payer: COMMERCIAL

## 2025-05-09 ENCOUNTER — LAB ENCOUNTER (OUTPATIENT)
Dept: LAB | Facility: HOSPITAL | Age: 55
End: 2025-05-09
Attending: INTERNAL MEDICINE
Payer: COMMERCIAL

## 2025-05-09 ENCOUNTER — TELEPHONE (OUTPATIENT)
Dept: RHEUMATOLOGY | Facility: CLINIC | Age: 55
End: 2025-05-09

## 2025-05-09 DIAGNOSIS — M05.79 RHEUMATOID ARTHRITIS INVOLVING MULTIPLE SITES WITH POSITIVE RHEUMATOID FACTOR (HCC): ICD-10-CM

## 2025-05-09 DIAGNOSIS — Z51.81 ENCOUNTER FOR THERAPEUTIC DRUG MONITORING: ICD-10-CM

## 2025-05-09 DIAGNOSIS — R76.12 FALSE POSITIVE QUANTIFERON-TB GOLD TEST: ICD-10-CM

## 2025-05-09 LAB
ALT SERPL-CCNC: 9 U/L (ref 10–49)
AST SERPL-CCNC: 16 U/L (ref ?–34)
CREAT BLD-MCNC: 0.53 MG/DL (ref 0.55–1.02)
CRP SERPL-MCNC: 11.8 MG/DL (ref ?–1)
DEPRECATED RDW RBC AUTO: 52.8 FL (ref 35.1–46.3)
EGFRCR SERPLBLD CKD-EPI 2021: 109 ML/MIN/1.73M2 (ref 60–?)
ERYTHROCYTE [DISTWIDTH] IN BLOOD BY AUTOMATED COUNT: 18.6 % (ref 11–15)
ERYTHROCYTE [SEDIMENTATION RATE] IN BLOOD: 126 MM/HR (ref 0–30)
HCT VFR BLD AUTO: 29.7 % (ref 35–48)
HGB BLD-MCNC: 8.4 G/DL (ref 12–16)
MCH RBC QN AUTO: 23.3 PG (ref 26–34)
MCHC RBC AUTO-ENTMCNC: 28.3 G/DL (ref 31–37)
MCV RBC AUTO: 82.3 FL (ref 80–100)
PLATELET # BLD AUTO: 854 10(3)UL (ref 150–450)
RBC # BLD AUTO: 3.61 X10(6)UL (ref 3.8–5.3)
WBC # BLD AUTO: 6.7 X10(3) UL (ref 4–11)

## 2025-05-09 PROCEDURE — 85027 COMPLETE CBC AUTOMATED: CPT

## 2025-05-09 PROCEDURE — 82565 ASSAY OF CREATININE: CPT

## 2025-05-09 PROCEDURE — 86480 TB TEST CELL IMMUN MEASURE: CPT

## 2025-05-09 PROCEDURE — 86140 C-REACTIVE PROTEIN: CPT

## 2025-05-09 PROCEDURE — 36415 COLL VENOUS BLD VENIPUNCTURE: CPT

## 2025-05-09 PROCEDURE — 84450 TRANSFERASE (AST) (SGOT): CPT

## 2025-05-09 PROCEDURE — 85652 RBC SED RATE AUTOMATED: CPT

## 2025-05-09 PROCEDURE — 71046 X-RAY EXAM CHEST 2 VIEWS: CPT | Performed by: INTERNAL MEDICINE

## 2025-05-09 PROCEDURE — 84460 ALANINE AMINO (ALT) (SGPT): CPT

## 2025-05-09 NOTE — TELEPHONE ENCOUNTER
Patient called stating they need the referral for crutches to be sent directly through the [facility], and if not to be faxed over to the following phone number for [facility]    Fax: [phone]    Phone number for [facility]: [phone]

## 2025-05-12 ENCOUNTER — TELEPHONE (OUTPATIENT)
Dept: RHEUMATOLOGY | Facility: CLINIC | Age: 55
End: 2025-05-12

## 2025-05-12 DIAGNOSIS — M05.79 RHEUMATOID ARTHRITIS INVOLVING MULTIPLE SITES WITH POSITIVE RHEUMATOID FACTOR (HCC): ICD-10-CM

## 2025-05-12 LAB
M TB IFN-G CD4+ T-CELLS BLD-ACNC: 0.08 IU/ML
M TB TUBERC IFN-G BLD QL: NEGATIVE
M TB TUBERC IGNF/MITOGEN IGNF CONTROL: 1.52 IU/ML
QFT TB1 AG MINUS NIL: -0.04 IU/ML
QFT TB2 AG MINUS NIL: -0.06 IU/ML

## 2025-05-12 RX ORDER — TRAMADOL HYDROCHLORIDE 50 MG/1
50 TABLET ORAL EVERY 12 HOURS PRN
Qty: 60 TABLET | Refills: 5 | Status: CANCELLED | OUTPATIENT
Start: 2025-05-12

## 2025-05-12 NOTE — TELEPHONE ENCOUNTER
Patient calling to ask to to have scripts transferred to [facility] as has been having issues getting form [facility], only got a two weeks supply of methotrexate from [facility]. Stated has be 90 day supply to [facility].       Methylprednisolone     Current Outpatient Medications   Medication Sig Dispense Refill    traMADol 50 MG Oral Tab Take 1 tablet (50 mg total) by mouth every 12 (twelve) hours as needed. 60 tablet 5    predniSONE 5 MG Oral Tab Take 1 tablet (5 mg total) by mouth daily. 90 tablet 3    methotrexate 2.5 MG Oral Tab Take 8 tablets (20 mg total) by mouth once a week. 104 tablet 0    folic acid 1 MG Oral Tab Take 1 tablet (1 mg total) by mouth daily. 90 tablet 3    ibuprofen 800 MG Oral Tab Take 1 tablet (800 mg total) by mouth every 6 (six) hours as needed for Pain. 360 tablet 1

## 2025-05-12 NOTE — TELEPHONE ENCOUNTER
Last completed office visit: 4/7/2025 Rhonad Abarca MD   Next scheduled Follow up: No future appointments.   RTC: 3 months    1/24/2025: Glucose 149 (H); Sodium 145; Potassium 3.5; Chloride 104; CO2 28.0; Calcium, Total 9.5  5/9/2025: Creatinine 0.53 (L); eGFR-Cr 109; AST 16; ALT 9 (L)  Recent Results (from the past 8760 hours)   CBC, Platelet; No Differential    Collection Time: 05/09/25  7:01 PM   Result Value Ref Range    WBC 6.7 4.0 - 11.0 x10(3) uL    RBC 3.61 (L) 3.80 - 5.30 x10(6)uL    HGB 8.4 (L) 12.0 - 16.0 g/dL    HCT 29.7 (L) 35.0 - 48.0 %    MCV 82.3 80.0 - 100.0 fL    MCH 23.3 (L) 26.0 - 34.0 pg    MCHC 28.3 (L) 31.0 - 37.0 g/dL    RDW 18.6 (H) 11.0 - 15.0 %    RDW-SD 52.8 (H) 35.1 - 46.3 fL    .0 (H) 150.0 - 450.0 10(3)uL     *Note: Due to a large number of results and/or encounters for the requested time period, some results have not been displayed. A complete set of results can be found in Results Review.      Recent Results (from the past 8760 hours)   Basic Metabolic Panel (8)    Collection Time: 01/24/25 12:24 PM   Result Value Ref Range    Glucose 149 (H) 70 - 99 mg/dL    Sodium 145 136 - 145 mmol/L    Potassium 3.5 3.5 - 5.1 mmol/L    Chloride 104 98 - 112 mmol/L    CO2 28.0 21.0 - 32.0 mmol/L    Anion Gap 13 0 - 18 mmol/L    BUN 13 9 - 23 mg/dL    Creatinine 0.53 (L) 0.55 - 1.02 mg/dL    BUN/CREA Ratio 24.5 (H) 10.0 - 20.0    Calcium, Total 9.5 8.7 - 10.4 mg/dL    Calculated Osmolality 303 (H) 275 - 295 mOsm/kg    eGFR-Cr 110 >=60 mL/min/1.73m2    Patient Fasting for BMP? No      *Note: Due to a large number of results and/or encounters for the requested time period, some results have not been displayed. A complete set of results can be found in Results Review.

## 2025-05-12 NOTE — TELEPHONE ENCOUNTER
Mamadou the patient hadn't had a recent CMP or BMP ordered but did get all the labs you requested    [facility] Lab Encounter on 05/09/2025   Component Date Value Ref Range Status    AST 05/09/2025 16  <34 U/L Final    C-Reactive Protein 05/09/2025 11.80 (H)  <1.00 mg/dL Final    Creatinine 05/09/2025 0.53 (L)  0.55 - 1.02 mg/dL Final    eGFR-Cr 05/09/2025 109  >=60 mL/min/1.73m2 Final    eGFR calculated using the CKD-EPI 2021 calculation    Sed Rate 05/09/2025 126 (H)  0 - 30 mm/Hr Final    WBC 05/09/2025 6.7  4.0 - 11.0 x10(3) uL Final    RBC 05/09/2025 3.61 (L)  3.80 - 5.30 x10(6)uL Final    HGB 05/09/2025 8.4 (L)  12.0 - 16.0 g/dL Final    HCT 05/09/2025 29.7 (L)  35.0 - 48.0 % Final    MCV 05/09/2025 82.3  80.0 - 100.0 fL Final    MCH 05/09/2025 23.3 (L)  26.0 - 34.0 pg Final    MCHC 05/09/2025 28.3 (L)  31.0 - 37.0 g/dL Final    RDW 05/09/2025 18.6 (H)  11.0 - 15.0 % Final    RDW-SD 05/09/2025 52.8 (H)  35.1 - 46.3 fL Final    PLT 05/09/2025 854.0 (H)  150.0 - 450.0 10(3)uL Final    ALT 05/09/2025 9 (L)  10 - 49 U/L Final    Quantiferon TB NIL 05/09/2025 0.08  IU/mL Final    Quantiferon TB1 minus NIL 05/09/2025 -0.04  IU/mL Final    Quantiferon TB2 minus NIL 05/09/2025 -0.06  IU/mL Final    Quantiferon TB Mitogen minus NIL 05/09/2025 1.52  IU/mL Final    Quantiferon TB Result 05/09/2025 Negative  Negative Final    Interpretation: M. tuberculosis infection NOT likely.   False negative can occur in early infection and in active disease when immune function is impaired. A single negative result does not exclude infection with M. tuberculosis. In patients at high risk for M. tuberculosis infection, a second test should be considered in accordance with the 2017 ATS/IDSA/CDC Clinical Practice Guidelines.

## 2025-05-13 DIAGNOSIS — M05.79 RHEUMATOID ARTHRITIS INVOLVING MULTIPLE SITES WITH POSITIVE RHEUMATOID FACTOR (HCC): ICD-10-CM

## 2025-05-13 RX ORDER — IBUPROFEN 800 MG/1
800 TABLET, FILM COATED ORAL EVERY 6 HOURS PRN
Qty: 360 TABLET | Refills: 1 | Status: SHIPPED | OUTPATIENT
Start: 2025-05-13

## 2025-05-13 RX ORDER — PREDNISONE 5 MG/1
5 TABLET ORAL DAILY
Qty: 90 TABLET | Refills: 3 | Status: SHIPPED | OUTPATIENT
Start: 2025-05-13

## 2025-05-13 RX ORDER — FOLIC ACID 1 MG/1
1 TABLET ORAL DAILY
Qty: 90 TABLET | Refills: 3 | Status: SHIPPED | OUTPATIENT
Start: 2025-05-13 | End: 2026-05-13

## 2025-05-13 RX ORDER — UPADACITINIB 15 MG/1
1 TABLET, EXTENDED RELEASE ORAL DAILY
Qty: 30 TABLET | Refills: 5 | Status: SHIPPED | OUTPATIENT
Start: 2025-05-13

## 2025-05-13 RX ORDER — TRAMADOL HYDROCHLORIDE 50 MG/1
50 TABLET ORAL EVERY 12 HOURS PRN
Qty: 60 TABLET | Refills: 5 | Status: SHIPPED | OUTPATIENT
Start: 2025-05-13

## 2025-05-13 RX ORDER — METHOTREXATE 2.5 MG/1
20 TABLET ORAL WEEKLY
Qty: 104 TABLET | Refills: 0 | Status: SHIPPED | OUTPATIENT
Start: 2025-05-13

## 2025-05-13 NOTE — TELEPHONE ENCOUNTER
Requested Prescriptions     Pending Prescriptions Disp Refills    RINVOQ 15 MG Oral Tablet 24 Hr [Pharmacy Med Name: RINVOQ ER 15 MG TABLET] 30 tablet 5     Sig: TAKE 1 TABLET DAILY     LOV: 4/7/25  No future appointments.  Labs:   Component      Latest Ref Rng 5/9/2025   WBC      4.0 - 11.0 x10(3) uL 6.7    RBC      3.80 - 5.30 x10(6)uL 3.61 (L)    Hemoglobin      12.0 - 16.0 g/dL 8.4 (L)    Hematocrit      35.0 - 48.0 % 29.7 (L)    MCV      80.0 - 100.0 fL 82.3    MCH      26.0 - 34.0 pg 23.3 (L)    MCHC      31.0 - 37.0 g/dL 28.3 (L)    RDW      11.0 - 15.0 % 18.6 (H)    RDW-SD      35.1 - 46.3 fL 52.8 (H)    Platelet Count      150.0 - 450.0 10(3)uL 854.0 (H)    Quantiferon TB NIL      IU/mL 0.08    Quantiferon-TB1 Minus NIL      IU/mL -0.04    Quantiferon-TB2 Minus NIL      IU/mL -0.06    Quantiferon TB Mitogen minus NIL      IU/mL 1.52    Quantiferon TB Result      Negative  Negative    CREATININE      0.55 - 1.02 mg/dL 0.53 (L)    EGFR      >=60 mL/min/1.73m2 109    AST (SGOT)      <34 U/L 16    C-REACTIVE PROTEIN      <1.00 mg/dL 11.80 (H)    SED RATE      0 - 30 mm/Hr 126 (H)    ALT (SGPT)      10 - 49 U/L 9 (L)       Legend:  (L) Low  (H) High    Summary:  Take rinvoq 15mg a day -0 will see if this helps your knee pain   2. Cont.Methtotrexate to 8  tablets a week and folic acid 1mg ad ay   3. Tramadol 50mg every 12 hours as needed  #60  4. Cont. Prednisone 5mg a day    5. Check bone density test - call [phone] -   6.  Check chest xray -  7.  Return to clinic  in 3 months  8/ check labs at end of months  4/29      Rhonda Abarca MD  4/7/2025   11:55 AM

## 2025-05-21 ENCOUNTER — TELEPHONE (OUTPATIENT)
Dept: RHEUMATOLOGY | Facility: CLINIC | Age: 55
End: 2025-05-21

## 2025-05-21 DIAGNOSIS — M05.79 RHEUMATOID ARTHRITIS INVOLVING MULTIPLE SITES WITH POSITIVE RHEUMATOID FACTOR (HCC): ICD-10-CM

## 2025-05-21 NOTE — TELEPHONE ENCOUNTER
Patient stated received refills of the following medications, but did not get enough refills to last the six months, asking for additional refills to be sent:     Methotrexate has no refills  Folic acid only has three refills  Ibuprofen only has one refill  Prednisone has three refills    Also asking [facility]

## 2025-05-27 RX ORDER — METHOTREXATE 2.5 MG/1
20 TABLET ORAL WEEKLY
Qty: 104 TABLET | Refills: 1 | Status: SHIPPED | OUTPATIENT
Start: 2025-05-27

## 2025-05-27 RX ORDER — TRAMADOL HYDROCHLORIDE 50 MG/1
50 TABLET ORAL EVERY 12 HOURS PRN
Qty: 60 TABLET | Refills: 5 | Status: SHIPPED | OUTPATIENT
Start: 2025-05-27

## 2025-05-27 NOTE — TELEPHONE ENCOUNTER
Called patient back verified patient name and . Informed patient we needed her to schedule an appointment with one of our other two providers as they are already booking out into November. This way there is no issues later on. Informed her we would let Dr. Abarca know about the extra refills wanted on her one medication and change the pharmacy for her tramadol.    Dr. Abarca patient would like at least 6 months worth of Methotrexate ordered currently her script is just for the 3 months I have pended order with 3 month and 1 refill for your review as well as pended tramadol order change in pharmacy only

## 2025-05-31 ENCOUNTER — TELEPHONE (OUTPATIENT)
Dept: RHEUMATOLOGY | Facility: CLINIC | Age: 55
End: 2025-05-31

## 2025-05-31 DIAGNOSIS — M05.79 RHEUMATOID ARTHRITIS INVOLVING MULTIPLE SITES WITH POSITIVE RHEUMATOID FACTOR (HCC): Primary | ICD-10-CM

## 2025-05-31 DIAGNOSIS — Z51.81 ENCOUNTER FOR THERAPEUTIC DRUG MONITORING: ICD-10-CM

## 2025-06-04 NOTE — TELEPHONE ENCOUNTER
Script sent to pharmacy 5/13/2025.    Sabinari[facility] for Rinvoq 15 mg tablet: Prior Authorization not required for patient/medication   Notes  Prior Authorization not required for patient/medication

## 2025-06-25 ENCOUNTER — TELEPHONE (OUTPATIENT)
Dept: INTERNAL MEDICINE CLINIC | Facility: CLINIC | Age: 55
End: 2025-06-25

## 2025-07-03 NOTE — TELEPHONE ENCOUNTER
Pt contacted. Verified name and . Pt informed of Dr. Bonnie Montgomery message. She verbalized understanding. Televisit appointment confirmed.
She has an appointment to see me on September 27. We will do those during the office visit.   Thank you
The patient stated she contacted the [facility]. They are requesting a note stating why she needs these services. The note needs to include her conditions/problems. She also needs a list of her medications along with the notes. Please mail the note to the patient.
No

## (undated) DIAGNOSIS — D50.8 OTHER IRON DEFICIENCY ANEMIA: ICD-10-CM

## (undated) DIAGNOSIS — M05.79 RHEUMATOID ARTHRITIS INVOLVING MULTIPLE SITES WITH POSITIVE RHEUMATOID FACTOR (HCC): ICD-10-CM

## (undated) DIAGNOSIS — M05.79 SEROPOSITIVE RHEUMATOID ARTHRITIS OF MULTIPLE SITES (HCC): Primary | ICD-10-CM

## (undated) NOTE — LETTER
02/06/18        Mayte Lugo  [address] [facility] [address] [place] [address]      Dear Barb Schwab,    Our records indicate that you have outstanding lab work and or testing that was ordered for you and has not yet been completed:        Lipid Panel

## (undated) NOTE — LETTER
Joshua Framed Data (Legal Name)   46year old, 4/17/1970    Legal sex: Female    Marital status:     Race: Black or , Other    Ethnicity: NON  OR  OR  ETHNICITY    Preferred language: English    Occupation: Other or No services have already been provided, please file this Referral Summary with your insurance documents until all claims have been paid. THIS REFERRAL DOES NOT AUTHORIZE ANY DIAGNOSTIC TESTING OR SURGICAL PROCEDURES, UNLESS SPECIFICALLY LISTED BELOW.   Nick Pavon any questions regarding this referral, please contact an Anthony Ferguson at [phone], [address] [facility], [place] [address] all  [x]Manual[x]Template[]Copied    Added by:  Libra Augustin MD      []Damiver for details      Idania Santoyo is a 46year old female. Patient presents with:  Physical     HPI:   57-year-old lady with a past medical history of rheumatoid arthritis, MGUS Sulfate 325 (65 Fe) MG Oral Tab Take 1 tablet (325 mg total) by mouth 2 (two) times daily with meals. (Patient not taking: No sig reported) 60 tablet 2   • Ketoconazole 2 % External Shampoo Apply to scalp twice a week as directed.  (Patient not taking: No s distention, abdominal pain and vomiting. Genitourinary: Negative for hematuria. Musculoskeletal: Positive for arthralgias, back pain, gait problem and myalgias. Skin: Negative for wound. Psychiatric/Behavioral: Negative for behavioral problems. at baseline. Cranial Nerves: No cranial nerve deficit. Coordination: Coordination normal.      Gait: Gait abnormal.   Psychiatric:         Mood and Affect: Mood normal.         Behavior: Behavior normal.         Thought Content:  Thought content n file.                Electronically signed by Ewing Kehr, MD at 10/3/2021  5:12 PM       Office Visit on 9/30/2021           Office Visit on 9/30/2021                Detailed Report            Note shared with patient        Chart Review: Note Routing

## (undated) NOTE — LETTER
`  (For Outpatient Use Only) Initial Admit Date: 10/4/2021   Inpt/Obs Admit Date: Inpt: N/A / Obs: N/A   Discharge Date:     Hospital Acct:      MIKE: [de-identified]   CSN: [id number]   CEID: [id number]         ENCOUNTER  Patient Class:   Admitt Group Number:   Insurance Type:     Subscriber Name:   Subscriber :     Subscriber ID:   Pt Rel to Subscriber: [facility] Account Financial Class: No info available    2021                   Active Insurance as of 10/6/2021      Janna Whitfield for authorization, please wait 5-7 days and then contact your physician's office. At that time, you will be provided with any authorization numbers or be assured that none are required. You can then schedule your appointment.  Failure to obtain required aut • TraMADol HCl 50 MG Oral Tab Take 1 tablet (50 mg total) by mouth every 6 (six) hours as needed for Pain.  100 tablet 1   • METHOTREXATE 2.5 MG Oral Tab TAKE 8 TABLETS BY MOUTH EVERY FRIDAY WITH DINNER (Patient not taking: No sig reported) 104 tablet 0 Alcohol/week: 0.0 standard drinks    Drug use:  No            Family History   Problem Relation Age of Onset   • Prostate Cancer Father     • Diabetes Father     • Hypertension Mother     • Musculo-skelatal Disorder Mother           spinal stenosis   • Arth Pharynx: Oropharynx is clear. Eyes:      General: No scleral icterus. Conjunctiva/sclera: Conjunctivae normal.   Neck:      Vascular: No carotid bruit. Cardiovascular:      Rate and Rhythm: Normal rate. Pulses: Normal pulses.       Heart sounds with fruits and vegetables. Avoid too much of sweets and carbohydrates. She is wheelchair dependent. She is requesting home physical therapy. I have agreed for that.   Her 's help her with the transportation and for her activities of daily living Infusion Orders    No relevant orders to display.           Medication Waste Documentation      Orders Placed         CBC, PLATELET; NO DIFFERENTIAL         COMP METABOLIC PANEL (14)         HEMOGLOBIN A1C         HOME HEALTH (EXT) Open         RHEUMATO

## (undated) NOTE — LETTER
09/11/23    Hello,     This is the [facility], office of Dr. Hamlet Robert    Thank you for putting your trust in [facility]. Our goal is to deliver the highest quality healthcare and an exceptional patient experience. Upon reviewing of your medical record shows you are due for the following:       Annual Physical   Pap Smear       Please call [phone] to schedule your appointment or schedule online via [employer]. If you changed to a new provider at another facility, please notify the clinic to update your records. If you had any recent testing at another facility, please have your results faxed to our office at [phone]. Thank you and have a great day!

## (undated) NOTE — LETTER
10/28/2021              Shannan Tucker        [address]  [address] [address]         To whom it may concern    Shannan Tucker is a patient under my care. This is to certify that she has debilitating rheumatoid arthritis.   She

## (undated) NOTE — MR AVS SNAPSHOT
Raimundo Umana   3/21/2017 4:00 PM   Office Visit   MRN:  [id number]    Description:  Female : 1970   Provider:  Angelia Curiel   Department:  [facility] Hematology Oncology              Visit Summary      Primary Visit Diagnosis an Open Medical Record policy. We can provide you with your current medication list and lab results today.   If you would like to review your medical record, we can assist you to set up an appointment with the appropriate medical professional to review you

## (undated) NOTE — MR AVS SNAPSHOT
Mony Lim   3/8/2017 10:00 AM   Office Visit   MRN:  [id number]    Description:  Female : 1970   Provider:  Rosario Mayorga   Department:  [facility] Hematology Oncology              Visit Summary      Primary Visit Diagnosis healthcare providers. At [facility] we continue to have an Open Medical Record policy. We can provide you with your current medication list and lab results today.   If you would like to review your medical record, we can assist you to se